# Patient Record
Sex: FEMALE | Race: WHITE | NOT HISPANIC OR LATINO | Employment: OTHER | URBAN - METROPOLITAN AREA
[De-identification: names, ages, dates, MRNs, and addresses within clinical notes are randomized per-mention and may not be internally consistent; named-entity substitution may affect disease eponyms.]

---

## 2017-01-23 ENCOUNTER — OFFICE VISIT - HEALTHEAST (OUTPATIENT)
Dept: PHYSICAL THERAPY | Facility: REHABILITATION | Age: 77
End: 2017-01-23

## 2017-01-23 DIAGNOSIS — M25.512 PAIN IN JOINT OF LEFT SHOULDER: ICD-10-CM

## 2017-01-23 DIAGNOSIS — I89.0 LYMPHEDEMA OF LEFT ARM: ICD-10-CM

## 2017-01-23 DIAGNOSIS — M25.612 STIFFNESS OF LEFT SHOULDER JOINT: ICD-10-CM

## 2017-01-23 DIAGNOSIS — M62.81 GENERALIZED MUSCLE WEAKNESS: ICD-10-CM

## 2017-01-25 ENCOUNTER — OFFICE VISIT - HEALTHEAST (OUTPATIENT)
Dept: PHYSICAL THERAPY | Facility: REHABILITATION | Age: 77
End: 2017-01-25

## 2017-01-25 DIAGNOSIS — M62.81 GENERALIZED MUSCLE WEAKNESS: ICD-10-CM

## 2017-01-25 DIAGNOSIS — M24.512 SHOULDER JOINT CONTRACTURE, LEFT: ICD-10-CM

## 2017-01-25 DIAGNOSIS — M25.612 STIFFNESS OF LEFT SHOULDER JOINT: ICD-10-CM

## 2017-01-25 DIAGNOSIS — I89.0 LYMPHEDEMA OF LEFT ARM: ICD-10-CM

## 2017-01-25 DIAGNOSIS — M25.512 PAIN IN JOINT OF LEFT SHOULDER: ICD-10-CM

## 2017-01-30 ENCOUNTER — OFFICE VISIT - HEALTHEAST (OUTPATIENT)
Dept: PHYSICAL THERAPY | Facility: REHABILITATION | Age: 77
End: 2017-01-30

## 2017-01-30 DIAGNOSIS — M25.512 PAIN IN JOINT OF LEFT SHOULDER: ICD-10-CM

## 2017-01-30 DIAGNOSIS — M62.81 GENERALIZED MUSCLE WEAKNESS: ICD-10-CM

## 2017-01-30 DIAGNOSIS — M25.612 STIFFNESS OF LEFT SHOULDER JOINT: ICD-10-CM

## 2017-01-30 DIAGNOSIS — I89.0 LYMPHEDEMA OF LEFT ARM: ICD-10-CM

## 2017-02-08 ENCOUNTER — OFFICE VISIT - HEALTHEAST (OUTPATIENT)
Dept: PHYSICAL THERAPY | Facility: REHABILITATION | Age: 77
End: 2017-02-08

## 2017-02-08 DIAGNOSIS — I89.0 LYMPHEDEMA OF LEFT ARM: ICD-10-CM

## 2017-03-06 ENCOUNTER — OFFICE VISIT - HEALTHEAST (OUTPATIENT)
Dept: PHYSICAL THERAPY | Facility: REHABILITATION | Age: 77
End: 2017-03-06

## 2017-03-06 ENCOUNTER — AMBULATORY - HEALTHEAST (OUTPATIENT)
Dept: ADMINISTRATIVE | Facility: REHABILITATION | Age: 77
End: 2017-03-06

## 2017-03-06 DIAGNOSIS — M62.81 GENERALIZED MUSCLE WEAKNESS: ICD-10-CM

## 2017-03-06 DIAGNOSIS — M25.611 STIFFNESS OF RIGHT SHOULDER JOINT: ICD-10-CM

## 2017-03-06 DIAGNOSIS — Z96.619 S/P REVERSE TOTAL SHOULDER ARTHROPLASTY: ICD-10-CM

## 2017-03-06 DIAGNOSIS — M25.512 PAIN IN JOINT OF LEFT SHOULDER: ICD-10-CM

## 2017-03-08 ENCOUNTER — OFFICE VISIT - HEALTHEAST (OUTPATIENT)
Dept: PHYSICAL THERAPY | Facility: REHABILITATION | Age: 77
End: 2017-03-08

## 2017-03-08 DIAGNOSIS — M24.512 SHOULDER JOINT CONTRACTURE, LEFT: ICD-10-CM

## 2017-03-08 DIAGNOSIS — M25.512 PAIN IN JOINT OF LEFT SHOULDER: ICD-10-CM

## 2017-03-08 DIAGNOSIS — M62.81 GENERALIZED MUSCLE WEAKNESS: ICD-10-CM

## 2017-03-15 ENCOUNTER — OFFICE VISIT - HEALTHEAST (OUTPATIENT)
Dept: PHYSICAL THERAPY | Facility: REHABILITATION | Age: 77
End: 2017-03-15

## 2017-03-15 ENCOUNTER — OFFICE VISIT - HEALTHEAST (OUTPATIENT)
Dept: VASCULAR SURGERY | Facility: CLINIC | Age: 77
End: 2017-03-15

## 2017-03-15 DIAGNOSIS — M25.512 LEFT SHOULDER PAIN: ICD-10-CM

## 2017-03-15 DIAGNOSIS — M25.512 PAIN IN JOINT OF LEFT SHOULDER: ICD-10-CM

## 2017-03-15 DIAGNOSIS — C50.912 MALIGNANT NEOPLASM OF LEFT FEMALE BREAST (H): ICD-10-CM

## 2017-03-15 DIAGNOSIS — I97.2 POST-MASTECTOMY LYMPHEDEMA SYNDROME: ICD-10-CM

## 2017-03-15 DIAGNOSIS — M24.512 SHOULDER JOINT CONTRACTURE, LEFT: ICD-10-CM

## 2017-03-15 DIAGNOSIS — M25.611 STIFFNESS OF RIGHT SHOULDER JOINT: ICD-10-CM

## 2017-03-15 DIAGNOSIS — L90.5 SCAR CONDITION AND FIBROSIS OF SKIN: ICD-10-CM

## 2017-03-15 DIAGNOSIS — M62.81 GENERALIZED MUSCLE WEAKNESS: ICD-10-CM

## 2017-03-20 ENCOUNTER — OFFICE VISIT - HEALTHEAST (OUTPATIENT)
Dept: PHYSICAL THERAPY | Facility: REHABILITATION | Age: 77
End: 2017-03-20

## 2017-03-20 DIAGNOSIS — M62.81 GENERALIZED MUSCLE WEAKNESS: ICD-10-CM

## 2017-03-20 DIAGNOSIS — M25.512 PAIN IN JOINT OF LEFT SHOULDER: ICD-10-CM

## 2017-03-20 DIAGNOSIS — M24.512 SHOULDER JOINT CONTRACTURE, LEFT: ICD-10-CM

## 2017-03-22 ENCOUNTER — OFFICE VISIT - HEALTHEAST (OUTPATIENT)
Dept: PHYSICAL THERAPY | Facility: REHABILITATION | Age: 77
End: 2017-03-22

## 2017-03-22 DIAGNOSIS — M62.81 GENERALIZED MUSCLE WEAKNESS: ICD-10-CM

## 2017-03-22 DIAGNOSIS — M25.512 PAIN IN JOINT OF LEFT SHOULDER: ICD-10-CM

## 2017-03-22 DIAGNOSIS — M24.512 SHOULDER JOINT CONTRACTURE, LEFT: ICD-10-CM

## 2017-03-27 ENCOUNTER — OFFICE VISIT - HEALTHEAST (OUTPATIENT)
Dept: PHYSICAL THERAPY | Facility: REHABILITATION | Age: 77
End: 2017-03-27

## 2017-03-27 DIAGNOSIS — M25.611 STIFFNESS OF RIGHT SHOULDER JOINT: ICD-10-CM

## 2017-03-27 DIAGNOSIS — M62.81 GENERALIZED MUSCLE WEAKNESS: ICD-10-CM

## 2017-03-27 DIAGNOSIS — M25.512 PAIN IN JOINT OF LEFT SHOULDER: ICD-10-CM

## 2017-03-27 DIAGNOSIS — M24.512 SHOULDER JOINT CONTRACTURE, LEFT: ICD-10-CM

## 2017-03-27 DIAGNOSIS — I89.0 LYMPHEDEMA OF LEFT ARM: ICD-10-CM

## 2017-03-29 ENCOUNTER — OFFICE VISIT - HEALTHEAST (OUTPATIENT)
Dept: PHYSICAL THERAPY | Facility: REHABILITATION | Age: 77
End: 2017-03-29

## 2017-03-29 DIAGNOSIS — I89.0 LYMPHEDEMA OF LEFT ARM: ICD-10-CM

## 2017-03-29 DIAGNOSIS — M25.611 STIFFNESS OF RIGHT SHOULDER JOINT: ICD-10-CM

## 2017-03-29 DIAGNOSIS — M25.612 STIFFNESS OF LEFT SHOULDER JOINT: ICD-10-CM

## 2017-03-29 DIAGNOSIS — M24.512 SHOULDER JOINT CONTRACTURE, LEFT: ICD-10-CM

## 2017-03-29 DIAGNOSIS — M25.512 PAIN IN JOINT OF LEFT SHOULDER: ICD-10-CM

## 2017-03-29 DIAGNOSIS — M62.81 GENERALIZED MUSCLE WEAKNESS: ICD-10-CM

## 2017-04-03 ENCOUNTER — OFFICE VISIT - HEALTHEAST (OUTPATIENT)
Dept: PHYSICAL THERAPY | Facility: REHABILITATION | Age: 77
End: 2017-04-03

## 2017-04-03 DIAGNOSIS — M25.612 STIFFNESS OF LEFT SHOULDER JOINT: ICD-10-CM

## 2017-04-03 DIAGNOSIS — M24.512 SHOULDER JOINT CONTRACTURE, LEFT: ICD-10-CM

## 2017-04-03 DIAGNOSIS — M25.611 STIFFNESS OF RIGHT SHOULDER JOINT: ICD-10-CM

## 2017-04-03 DIAGNOSIS — I89.0 LYMPHEDEMA OF LEFT ARM: ICD-10-CM

## 2017-04-03 DIAGNOSIS — M25.512 PAIN IN JOINT OF LEFT SHOULDER: ICD-10-CM

## 2017-04-03 DIAGNOSIS — M62.81 GENERALIZED MUSCLE WEAKNESS: ICD-10-CM

## 2017-04-05 ENCOUNTER — OFFICE VISIT - HEALTHEAST (OUTPATIENT)
Dept: PHYSICAL THERAPY | Facility: REHABILITATION | Age: 77
End: 2017-04-05

## 2017-04-05 DIAGNOSIS — M25.512 PAIN IN JOINT OF LEFT SHOULDER: ICD-10-CM

## 2017-04-05 DIAGNOSIS — M24.512 SHOULDER JOINT CONTRACTURE, LEFT: ICD-10-CM

## 2017-04-05 DIAGNOSIS — M62.81 GENERALIZED MUSCLE WEAKNESS: ICD-10-CM

## 2017-04-10 ENCOUNTER — OFFICE VISIT - HEALTHEAST (OUTPATIENT)
Dept: PHYSICAL THERAPY | Facility: REHABILITATION | Age: 77
End: 2017-04-10

## 2017-04-10 DIAGNOSIS — M62.81 GENERALIZED MUSCLE WEAKNESS: ICD-10-CM

## 2017-04-10 DIAGNOSIS — M25.512 PAIN IN JOINT OF LEFT SHOULDER: ICD-10-CM

## 2017-04-10 DIAGNOSIS — M24.512 SHOULDER JOINT CONTRACTURE, LEFT: ICD-10-CM

## 2017-04-12 ENCOUNTER — OFFICE VISIT - HEALTHEAST (OUTPATIENT)
Dept: PHYSICAL THERAPY | Facility: REHABILITATION | Age: 77
End: 2017-04-12

## 2017-04-12 DIAGNOSIS — M25.512 PAIN IN JOINT OF LEFT SHOULDER: ICD-10-CM

## 2017-04-12 DIAGNOSIS — M24.512 SHOULDER JOINT CONTRACTURE, LEFT: ICD-10-CM

## 2017-04-12 DIAGNOSIS — M62.81 GENERALIZED MUSCLE WEAKNESS: ICD-10-CM

## 2017-04-19 ENCOUNTER — OFFICE VISIT - HEALTHEAST (OUTPATIENT)
Dept: PHYSICAL THERAPY | Facility: REHABILITATION | Age: 77
End: 2017-04-19

## 2017-04-19 DIAGNOSIS — M25.512 PAIN IN JOINT OF LEFT SHOULDER: ICD-10-CM

## 2017-04-19 DIAGNOSIS — M24.512 SHOULDER JOINT CONTRACTURE, LEFT: ICD-10-CM

## 2017-04-19 DIAGNOSIS — M62.81 GENERALIZED MUSCLE WEAKNESS: ICD-10-CM

## 2017-04-26 ENCOUNTER — OFFICE VISIT - HEALTHEAST (OUTPATIENT)
Dept: PHYSICAL THERAPY | Facility: REHABILITATION | Age: 77
End: 2017-04-26

## 2017-04-26 DIAGNOSIS — M25.512 PAIN IN JOINT OF LEFT SHOULDER: ICD-10-CM

## 2017-04-26 DIAGNOSIS — M62.81 GENERALIZED MUSCLE WEAKNESS: ICD-10-CM

## 2017-04-26 DIAGNOSIS — M24.512 SHOULDER JOINT CONTRACTURE, LEFT: ICD-10-CM

## 2017-05-01 ENCOUNTER — OFFICE VISIT - HEALTHEAST (OUTPATIENT)
Dept: PHYSICAL THERAPY | Facility: REHABILITATION | Age: 77
End: 2017-05-01

## 2017-05-01 DIAGNOSIS — M62.81 GENERALIZED MUSCLE WEAKNESS: ICD-10-CM

## 2017-05-01 DIAGNOSIS — M25.612 STIFFNESS OF LEFT SHOULDER JOINT: ICD-10-CM

## 2017-05-01 DIAGNOSIS — M24.512 SHOULDER JOINT CONTRACTURE, LEFT: ICD-10-CM

## 2017-05-01 DIAGNOSIS — M25.512 PAIN IN JOINT OF LEFT SHOULDER: ICD-10-CM

## 2017-05-01 DIAGNOSIS — M25.611 STIFFNESS OF RIGHT SHOULDER JOINT: ICD-10-CM

## 2017-05-01 DIAGNOSIS — I89.0 LYMPHEDEMA OF LEFT ARM: ICD-10-CM

## 2017-05-08 ENCOUNTER — OFFICE VISIT - HEALTHEAST (OUTPATIENT)
Dept: PHYSICAL THERAPY | Facility: REHABILITATION | Age: 77
End: 2017-05-08

## 2017-05-08 DIAGNOSIS — M24.512 SHOULDER JOINT CONTRACTURE, LEFT: ICD-10-CM

## 2017-05-08 DIAGNOSIS — M62.81 GENERALIZED MUSCLE WEAKNESS: ICD-10-CM

## 2017-05-08 DIAGNOSIS — M25.512 PAIN IN JOINT OF LEFT SHOULDER: ICD-10-CM

## 2017-05-24 ENCOUNTER — OFFICE VISIT - HEALTHEAST (OUTPATIENT)
Dept: PHYSICAL THERAPY | Facility: REHABILITATION | Age: 77
End: 2017-05-24

## 2017-05-24 DIAGNOSIS — M24.512 SHOULDER JOINT CONTRACTURE, LEFT: ICD-10-CM

## 2017-05-24 DIAGNOSIS — M62.81 GENERALIZED MUSCLE WEAKNESS: ICD-10-CM

## 2017-05-24 DIAGNOSIS — M25.512 PAIN IN JOINT OF LEFT SHOULDER: ICD-10-CM

## 2017-06-12 ENCOUNTER — OFFICE VISIT - HEALTHEAST (OUTPATIENT)
Dept: VASCULAR SURGERY | Facility: CLINIC | Age: 77
End: 2017-06-12

## 2017-06-12 DIAGNOSIS — M24.512 SHOULDER JOINT CONTRACTURE, LEFT: ICD-10-CM

## 2017-06-12 DIAGNOSIS — C50.912 MALIGNANT NEOPLASM OF LEFT FEMALE BREAST (H): ICD-10-CM

## 2017-06-12 DIAGNOSIS — L90.5 SCAR CONDITION AND FIBROSIS OF SKIN: ICD-10-CM

## 2017-06-12 DIAGNOSIS — I97.2 POST-MASTECTOMY LYMPHEDEMA SYNDROME: ICD-10-CM

## 2017-11-06 ENCOUNTER — RECORDS - HEALTHEAST (OUTPATIENT)
Dept: ADMINISTRATIVE | Facility: OTHER | Age: 77
End: 2017-11-06

## 2017-11-27 ENCOUNTER — HOSPITAL ENCOUNTER (OUTPATIENT)
Dept: RADIOLOGY | Facility: HOSPITAL | Age: 77
Discharge: HOME | End: 2017-11-27
Attending: INTERNAL MEDICINE

## 2017-11-27 ENCOUNTER — COMMUNICATION - HEALTHEAST (OUTPATIENT)
Dept: TELEHEALTH | Facility: CLINIC | Age: 77
End: 2017-11-27

## 2017-11-27 ENCOUNTER — HOSPITAL ENCOUNTER (OUTPATIENT)
Dept: MAMMOGRAPHY | Facility: HOSPITAL | Age: 77
Discharge: HOME | End: 2017-11-27

## 2017-11-27 DIAGNOSIS — R07.9 CHEST PAIN: ICD-10-CM

## 2017-11-27 DIAGNOSIS — Z12.31 VISIT FOR SCREENING MAMMOGRAM: ICD-10-CM

## 2017-11-27 DIAGNOSIS — C50.919 BREAST CANCER (H): ICD-10-CM

## 2018-01-22 ENCOUNTER — AMBULATORY - HEALTHEAST (OUTPATIENT)
Dept: VASCULAR SURGERY | Facility: CLINIC | Age: 78
End: 2018-01-22

## 2018-01-22 ENCOUNTER — COMMUNICATION - HEALTHEAST (OUTPATIENT)
Dept: VASCULAR SURGERY | Facility: CLINIC | Age: 78
End: 2018-01-22

## 2018-01-22 DIAGNOSIS — I97.2 POST-MASTECTOMY LYMPHEDEMA SYNDROME: ICD-10-CM

## 2018-01-22 DIAGNOSIS — C50.912 MALIGNANT NEOPLASM OF LEFT FEMALE BREAST (H): ICD-10-CM

## 2018-06-11 ENCOUNTER — AMBULATORY - HEALTHEAST (OUTPATIENT)
Dept: VASCULAR SURGERY | Facility: CLINIC | Age: 78
End: 2018-06-11

## 2018-06-11 ENCOUNTER — COMMUNICATION - HEALTHEAST (OUTPATIENT)
Dept: VASCULAR SURGERY | Facility: CLINIC | Age: 78
End: 2018-06-11

## 2018-06-11 ENCOUNTER — OFFICE VISIT - HEALTHEAST (OUTPATIENT)
Dept: VASCULAR SURGERY | Facility: CLINIC | Age: 78
End: 2018-06-11

## 2018-06-11 DIAGNOSIS — I97.2 POST-MASTECTOMY LYMPHEDEMA SYNDROME: ICD-10-CM

## 2018-06-11 DIAGNOSIS — C50.912 MALIGNANT NEOPLASM OF LEFT FEMALE BREAST (H): ICD-10-CM

## 2018-06-11 DIAGNOSIS — L90.5 SCAR CONDITION AND FIBROSIS OF SKIN: ICD-10-CM

## 2018-06-11 DIAGNOSIS — M24.512 SHOULDER JOINT CONTRACTURE, LEFT: ICD-10-CM

## 2018-06-11 RX ORDER — ACETAMINOPHEN 500 MG
1000 TABLET ORAL
Status: SHIPPED | COMMUNITY
Start: 2017-01-17

## 2018-06-11 RX ORDER — MULTIPLE VITAMINS W/ MINERALS TAB 9MG-400MCG
1 TAB ORAL
Status: SHIPPED | COMMUNITY
Start: 2015-06-25

## 2018-06-11 RX ORDER — FLECAINIDE ACETATE 50 MG/1
50 TABLET ORAL
Status: SHIPPED | COMMUNITY
Start: 2015-06-25

## 2018-06-11 RX ORDER — POLYETHYLENE GLYCOL 3350 17 G/17G
17 POWDER, FOR SOLUTION ORAL
Status: SHIPPED | COMMUNITY
Start: 2018-06-11

## 2018-06-11 RX ORDER — ATORVASTATIN CALCIUM 10 MG/1
10 TABLET, FILM COATED ORAL DAILY
Refills: 3 | Status: SHIPPED | COMMUNITY
Start: 2018-04-29

## 2018-06-11 RX ORDER — DOCUSATE SODIUM 100 MG/1
100 CAPSULE, LIQUID FILLED ORAL
Status: SHIPPED | COMMUNITY
Start: 2018-06-11

## 2018-06-11 ASSESSMENT — MIFFLIN-ST. JEOR: SCORE: 923.14

## 2018-06-12 ENCOUNTER — COMMUNICATION - HEALTHEAST (OUTPATIENT)
Dept: VASCULAR SURGERY | Facility: CLINIC | Age: 78
End: 2018-06-12

## 2018-06-14 ENCOUNTER — RECORDS - HEALTHEAST (OUTPATIENT)
Dept: ADMINISTRATIVE | Facility: OTHER | Age: 78
End: 2018-06-14

## 2018-06-15 ENCOUNTER — RECORDS - HEALTHEAST (OUTPATIENT)
Dept: ADMINISTRATIVE | Facility: OTHER | Age: 78
End: 2018-06-15

## 2018-06-15 ENCOUNTER — COMMUNICATION - HEALTHEAST (OUTPATIENT)
Dept: VASCULAR SURGERY | Facility: CLINIC | Age: 78
End: 2018-06-15

## 2018-06-18 ENCOUNTER — AMBULATORY - HEALTHEAST (OUTPATIENT)
Dept: VASCULAR SURGERY | Facility: CLINIC | Age: 78
End: 2018-06-18

## 2018-07-09 ENCOUNTER — RECORDS - HEALTHEAST (OUTPATIENT)
Dept: ADMINISTRATIVE | Facility: OTHER | Age: 78
End: 2018-07-09

## 2018-07-12 ENCOUNTER — COMMUNICATION - HEALTHEAST (OUTPATIENT)
Dept: VASCULAR SURGERY | Facility: CLINIC | Age: 78
End: 2018-07-12

## 2018-09-27 ENCOUNTER — OFFICE VISIT - HEALTHEAST (OUTPATIENT)
Dept: VASCULAR SURGERY | Facility: CLINIC | Age: 78
End: 2018-09-27

## 2018-09-27 DIAGNOSIS — C50.912 MALIGNANT NEOPLASM OF LEFT FEMALE BREAST (H): ICD-10-CM

## 2018-09-27 DIAGNOSIS — M24.512 SHOULDER JOINT CONTRACTURE, LEFT: ICD-10-CM

## 2018-09-27 DIAGNOSIS — L90.5 SCAR CONDITION AND FIBROSIS OF SKIN: ICD-10-CM

## 2018-09-27 DIAGNOSIS — Z87.2 HISTORY OF CELLULITIS: ICD-10-CM

## 2018-09-27 DIAGNOSIS — I97.2 POST-MASTECTOMY LYMPHEDEMA SYNDROME: ICD-10-CM

## 2018-09-27 ASSESSMENT — MIFFLIN-ST. JEOR: SCORE: 920.41

## 2019-02-05 ENCOUNTER — HOSPITAL ENCOUNTER (OUTPATIENT)
Dept: MAMMOGRAPHY | Facility: CLINIC | Age: 79
Discharge: HOME | End: 2019-02-05
Attending: OBSTETRICS & GYNECOLOGY

## 2019-02-05 DIAGNOSIS — Z12.31 VISIT FOR SCREENING MAMMOGRAM: ICD-10-CM

## 2019-09-18 ENCOUNTER — COMMUNICATION - HEALTHEAST (OUTPATIENT)
Dept: VASCULAR SURGERY | Facility: CLINIC | Age: 79
End: 2019-09-18

## 2019-09-18 ENCOUNTER — AMBULATORY - HEALTHEAST (OUTPATIENT)
Dept: VASCULAR SURGERY | Facility: CLINIC | Age: 79
End: 2019-09-18

## 2019-09-18 DIAGNOSIS — I97.2 POST-MASTECTOMY LYMPHEDEMA SYNDROME: ICD-10-CM

## 2019-09-18 DIAGNOSIS — C50.912 MALIGNANT NEOPLASM OF LEFT FEMALE BREAST (H): ICD-10-CM

## 2019-09-26 ENCOUNTER — OFFICE VISIT - HEALTHEAST (OUTPATIENT)
Dept: VASCULAR SURGERY | Facility: CLINIC | Age: 79
End: 2019-09-26

## 2019-09-26 DIAGNOSIS — I97.2 POST-MASTECTOMY LYMPHEDEMA SYNDROME: ICD-10-CM

## 2019-09-26 DIAGNOSIS — C50.012 MALIGNANT NEOPLASM OF NIPPLE OF LEFT BREAST IN FEMALE, UNSPECIFIED ESTROGEN RECEPTOR STATUS (H): ICD-10-CM

## 2019-09-26 DIAGNOSIS — M24.512 SHOULDER JOINT CONTRACTURE, LEFT: ICD-10-CM

## 2019-09-26 DIAGNOSIS — L90.5 SCAR CONDITION AND FIBROSIS OF SKIN: ICD-10-CM

## 2019-09-26 DIAGNOSIS — Z87.2 HISTORY OF CELLULITIS: ICD-10-CM

## 2019-09-26 RX ORDER — AMOXICILLIN 250 MG
1 CAPSULE ORAL 2 TIMES DAILY
Status: SHIPPED | COMMUNITY
Start: 2018-11-04

## 2019-09-26 RX ORDER — METOPROLOL TARTRATE 25 MG/1
25 TABLET, FILM COATED ORAL 2 TIMES DAILY
Status: SHIPPED | COMMUNITY
Start: 2015-06-25

## 2019-09-26 RX ORDER — DIAPER,BRIEF,INFANT-TODD,DISP
1 EACH MISCELLANEOUS 2 TIMES DAILY
Status: SHIPPED | COMMUNITY
Start: 2015-06-25

## 2019-09-26 ASSESSMENT — MIFFLIN-ST. JEOR: SCORE: 879.58

## 2019-10-10 ENCOUNTER — OFFICE VISIT - HEALTHEAST (OUTPATIENT)
Dept: PHYSICAL THERAPY | Facility: REHABILITATION | Age: 79
End: 2019-10-10

## 2019-10-10 DIAGNOSIS — L90.5 SCAR CONDITION AND FIBROSIS OF SKIN: ICD-10-CM

## 2019-10-10 DIAGNOSIS — M62.81 MUSCLE WEAKNESS (GENERALIZED): ICD-10-CM

## 2019-10-10 DIAGNOSIS — M24.512 SHOULDER JOINT CONTRACTURE, LEFT: ICD-10-CM

## 2019-10-10 DIAGNOSIS — I89.0 LYMPHEDEMA OF LEFT ARM: ICD-10-CM

## 2019-10-15 ENCOUNTER — OFFICE VISIT - HEALTHEAST (OUTPATIENT)
Dept: PHYSICAL THERAPY | Facility: REHABILITATION | Age: 79
End: 2019-10-15

## 2019-10-15 DIAGNOSIS — M24.512 SHOULDER JOINT CONTRACTURE, LEFT: ICD-10-CM

## 2019-10-15 DIAGNOSIS — L90.5 SCAR CONDITION AND FIBROSIS OF SKIN: ICD-10-CM

## 2019-10-15 DIAGNOSIS — I89.0 LYMPHEDEMA OF LEFT ARM: ICD-10-CM

## 2019-10-15 DIAGNOSIS — M62.81 MUSCLE WEAKNESS (GENERALIZED): ICD-10-CM

## 2019-10-22 ENCOUNTER — OFFICE VISIT - HEALTHEAST (OUTPATIENT)
Dept: PHYSICAL THERAPY | Facility: REHABILITATION | Age: 79
End: 2019-10-22

## 2019-10-22 DIAGNOSIS — M62.81 MUSCLE WEAKNESS (GENERALIZED): ICD-10-CM

## 2019-10-22 DIAGNOSIS — I89.0 LYMPHEDEMA OF LEFT ARM: ICD-10-CM

## 2019-10-22 DIAGNOSIS — M24.512 SHOULDER JOINT CONTRACTURE, LEFT: ICD-10-CM

## 2019-10-22 DIAGNOSIS — L90.5 SCAR CONDITION AND FIBROSIS OF SKIN: ICD-10-CM

## 2019-10-30 ENCOUNTER — OFFICE VISIT - HEALTHEAST (OUTPATIENT)
Dept: PHYSICAL THERAPY | Facility: REHABILITATION | Age: 79
End: 2019-10-30

## 2019-10-30 DIAGNOSIS — L90.5 SCAR CONDITION AND FIBROSIS OF SKIN: ICD-10-CM

## 2019-10-30 DIAGNOSIS — M24.512 SHOULDER JOINT CONTRACTURE, LEFT: ICD-10-CM

## 2019-10-30 DIAGNOSIS — I89.0 LYMPHEDEMA OF LEFT ARM: ICD-10-CM

## 2019-10-30 DIAGNOSIS — M62.81 MUSCLE WEAKNESS (GENERALIZED): ICD-10-CM

## 2019-11-12 ENCOUNTER — OFFICE VISIT - HEALTHEAST (OUTPATIENT)
Dept: PHYSICAL THERAPY | Facility: REHABILITATION | Age: 79
End: 2019-11-12

## 2019-11-12 DIAGNOSIS — I89.0 LYMPHEDEMA OF LEFT ARM: ICD-10-CM

## 2019-11-12 DIAGNOSIS — M24.512 SHOULDER JOINT CONTRACTURE, LEFT: ICD-10-CM

## 2019-11-12 DIAGNOSIS — M62.81 MUSCLE WEAKNESS (GENERALIZED): ICD-10-CM

## 2019-11-12 DIAGNOSIS — L90.5 SCAR CONDITION AND FIBROSIS OF SKIN: ICD-10-CM

## 2020-01-06 ENCOUNTER — OFFICE VISIT - HEALTHEAST (OUTPATIENT)
Dept: VASCULAR SURGERY | Facility: CLINIC | Age: 80
End: 2020-01-06

## 2020-01-06 DIAGNOSIS — M24.512 SHOULDER JOINT CONTRACTURE, LEFT: ICD-10-CM

## 2020-01-06 DIAGNOSIS — C50.912 MALIGNANT NEOPLASM OF LEFT BREAST IN FEMALE, ESTROGEN RECEPTOR POSITIVE, UNSPECIFIED SITE OF BREAST (H): ICD-10-CM

## 2020-01-06 DIAGNOSIS — L90.5 SCAR CONDITION AND FIBROSIS OF SKIN: ICD-10-CM

## 2020-01-06 DIAGNOSIS — Z17.0 MALIGNANT NEOPLASM OF LEFT BREAST IN FEMALE, ESTROGEN RECEPTOR POSITIVE, UNSPECIFIED SITE OF BREAST (H): ICD-10-CM

## 2020-01-06 DIAGNOSIS — Z87.2 HISTORY OF CELLULITIS: ICD-10-CM

## 2020-01-06 DIAGNOSIS — I97.2 POST-MASTECTOMY LYMPHEDEMA SYNDROME: ICD-10-CM

## 2020-01-06 ASSESSMENT — MIFFLIN-ST. JEOR: SCORE: 888.66

## 2020-01-07 ENCOUNTER — COMMUNICATION - HEALTHEAST (OUTPATIENT)
Dept: OTHER | Facility: CLINIC | Age: 80
End: 2020-01-07

## 2020-01-14 ENCOUNTER — COMMUNICATION - HEALTHEAST (OUTPATIENT)
Dept: OTHER | Facility: CLINIC | Age: 80
End: 2020-01-14

## 2020-01-16 ENCOUNTER — COMMUNICATION - HEALTHEAST (OUTPATIENT)
Dept: OTHER | Facility: CLINIC | Age: 80
End: 2020-01-16

## 2020-01-23 ENCOUNTER — AMBULATORY - HEALTHEAST (OUTPATIENT)
Dept: OTHER | Facility: CLINIC | Age: 80
End: 2020-01-23

## 2020-01-27 ENCOUNTER — COMMUNICATION - HEALTHEAST (OUTPATIENT)
Dept: OTHER | Facility: CLINIC | Age: 80
End: 2020-01-27

## 2020-01-28 ENCOUNTER — AMBULATORY - HEALTHEAST (OUTPATIENT)
Dept: OTHER | Facility: CLINIC | Age: 80
End: 2020-01-28

## 2020-02-17 ENCOUNTER — COMMUNICATION - HEALTHEAST (OUTPATIENT)
Dept: VASCULAR SURGERY | Facility: CLINIC | Age: 80
End: 2020-02-17

## 2020-02-19 ENCOUNTER — HOSPITAL ENCOUNTER (OUTPATIENT)
Dept: MAMMOGRAPHY | Facility: CLINIC | Age: 80
Discharge: HOME | End: 2020-02-19

## 2020-02-19 DIAGNOSIS — Z12.31 SCREENING MAMMOGRAM, ENCOUNTER FOR: ICD-10-CM

## 2020-07-06 ENCOUNTER — OFFICE VISIT - HEALTHEAST (OUTPATIENT)
Dept: VASCULAR SURGERY | Facility: CLINIC | Age: 80
End: 2020-07-06

## 2020-07-06 DIAGNOSIS — L90.5 SCAR CONDITION AND FIBROSIS OF SKIN: ICD-10-CM

## 2020-07-06 DIAGNOSIS — C50.912 MALIGNANT NEOPLASM OF LEFT BREAST IN FEMALE, ESTROGEN RECEPTOR POSITIVE, UNSPECIFIED SITE OF BREAST (H): ICD-10-CM

## 2020-07-06 DIAGNOSIS — I97.2 POST-MASTECTOMY LYMPHEDEMA SYNDROME: ICD-10-CM

## 2020-07-06 DIAGNOSIS — M24.512 SHOULDER JOINT CONTRACTURE, LEFT: ICD-10-CM

## 2020-07-06 DIAGNOSIS — Z87.2 HISTORY OF CELLULITIS: ICD-10-CM

## 2020-07-06 DIAGNOSIS — Z17.0 MALIGNANT NEOPLASM OF LEFT BREAST IN FEMALE, ESTROGEN RECEPTOR POSITIVE, UNSPECIFIED SITE OF BREAST (H): ICD-10-CM

## 2020-07-06 DIAGNOSIS — M46.46 DISCITIS OF LUMBAR REGION: ICD-10-CM

## 2020-07-08 ENCOUNTER — COMMUNICATION - HEALTHEAST (OUTPATIENT)
Dept: OTHER | Facility: CLINIC | Age: 80
End: 2020-07-08

## 2020-07-15 ENCOUNTER — COMMUNICATION - HEALTHEAST (OUTPATIENT)
Dept: OTHER | Facility: CLINIC | Age: 80
End: 2020-07-15

## 2020-07-21 ENCOUNTER — AMBULATORY - HEALTHEAST (OUTPATIENT)
Dept: OTHER | Facility: CLINIC | Age: 80
End: 2020-07-21

## 2020-07-22 ENCOUNTER — AMBULATORY - HEALTHEAST (OUTPATIENT)
Dept: VASCULAR SURGERY | Facility: CLINIC | Age: 80
End: 2020-07-22

## 2020-07-22 DIAGNOSIS — I97.2 POST-MASTECTOMY LYMPHEDEMA SYNDROME: ICD-10-CM

## 2020-08-18 ENCOUNTER — AMBULATORY - HEALTHEAST (OUTPATIENT)
Dept: OTHER | Facility: CLINIC | Age: 80
End: 2020-08-18

## 2021-01-07 ENCOUNTER — OFFICE VISIT - HEALTHEAST (OUTPATIENT)
Dept: VASCULAR SURGERY | Facility: CLINIC | Age: 81
End: 2021-01-07

## 2021-01-07 DIAGNOSIS — L90.5 SCAR CONDITION AND FIBROSIS OF SKIN: ICD-10-CM

## 2021-01-07 DIAGNOSIS — Z87.2 HISTORY OF CELLULITIS: ICD-10-CM

## 2021-01-07 DIAGNOSIS — Z17.0 MALIGNANT NEOPLASM OF LEFT BREAST IN FEMALE, ESTROGEN RECEPTOR POSITIVE, UNSPECIFIED SITE OF BREAST (H): ICD-10-CM

## 2021-01-07 DIAGNOSIS — I97.2 POST-MASTECTOMY LYMPHEDEMA SYNDROME: ICD-10-CM

## 2021-01-07 DIAGNOSIS — C50.912 MALIGNANT NEOPLASM OF LEFT BREAST IN FEMALE, ESTROGEN RECEPTOR POSITIVE, UNSPECIFIED SITE OF BREAST (H): ICD-10-CM

## 2021-01-07 DIAGNOSIS — M24.512 SHOULDER JOINT CONTRACTURE, LEFT: ICD-10-CM

## 2021-01-07 ASSESSMENT — MIFFLIN-ST. JEOR: SCORE: 885.48

## 2021-01-08 ENCOUNTER — COMMUNICATION - HEALTHEAST (OUTPATIENT)
Dept: OTHER | Facility: CLINIC | Age: 81
End: 2021-01-08

## 2021-03-03 ENCOUNTER — COMMUNICATION - HEALTHEAST (OUTPATIENT)
Dept: OTHER | Facility: CLINIC | Age: 81
End: 2021-03-03

## 2021-03-12 ENCOUNTER — AMBULATORY - HEALTHEAST (OUTPATIENT)
Dept: OTHER | Facility: CLINIC | Age: 81
End: 2021-03-12

## 2021-03-25 ENCOUNTER — AMBULATORY - HEALTHEAST (OUTPATIENT)
Dept: OTHER | Facility: CLINIC | Age: 81
End: 2021-03-25

## 2021-03-25 ENCOUNTER — AMBULATORY - HEALTHEAST (OUTPATIENT)
Dept: VASCULAR SURGERY | Facility: CLINIC | Age: 81
End: 2021-03-25

## 2021-03-25 DIAGNOSIS — I97.2 POST-MASTECTOMY LYMPHEDEMA SYNDROME: ICD-10-CM

## 2021-03-30 ENCOUNTER — AMBULATORY - HEALTHEAST (OUTPATIENT)
Dept: OTHER | Facility: CLINIC | Age: 81
End: 2021-03-30

## 2021-05-22 ENCOUNTER — HEALTH MAINTENANCE LETTER (OUTPATIENT)
Age: 81
End: 2021-05-22

## 2021-05-26 ENCOUNTER — RECORDS - HEALTHEAST (OUTPATIENT)
Dept: ADMINISTRATIVE | Facility: CLINIC | Age: 81
End: 2021-05-26

## 2021-05-27 ENCOUNTER — RECORDS - HEALTHEAST (OUTPATIENT)
Dept: ADMINISTRATIVE | Facility: CLINIC | Age: 81
End: 2021-05-27

## 2021-05-28 ENCOUNTER — RECORDS - HEALTHEAST (OUTPATIENT)
Dept: ADMINISTRATIVE | Facility: CLINIC | Age: 81
End: 2021-05-28

## 2021-05-29 ENCOUNTER — RECORDS - HEALTHEAST (OUTPATIENT)
Dept: ADMINISTRATIVE | Facility: CLINIC | Age: 81
End: 2021-05-29

## 2021-05-30 ENCOUNTER — RECORDS - HEALTHEAST (OUTPATIENT)
Dept: ADMINISTRATIVE | Facility: CLINIC | Age: 81
End: 2021-05-30

## 2021-05-31 ENCOUNTER — RECORDS - HEALTHEAST (OUTPATIENT)
Dept: ADMINISTRATIVE | Facility: CLINIC | Age: 81
End: 2021-05-31

## 2021-06-01 ENCOUNTER — RECORDS - HEALTHEAST (OUTPATIENT)
Dept: ADMINISTRATIVE | Facility: CLINIC | Age: 81
End: 2021-06-01

## 2021-06-01 VITALS — HEIGHT: 61 IN | WEIGHT: 111.5 LBS | BODY MASS INDEX: 21.05 KG/M2

## 2021-06-01 NOTE — PATIENT INSTRUCTIONS - HE
A referral was sent to [facility]. You will receive a phone call in 1-2 business days to schedule you appointment. If for some reason you do not hear from them or wish to schedule sooner please call [phone] to schedule.  [facility] - [address], [address]  [place] [address]    LYMPHEDEMA THERAPY TREATMENT     - What to expect your first visit     Based on your initial evaluation, you will have an individualized program designed by a certified lymphedema therapist.     It will consist of discussing your prior activity level, goals and limitations.     The therapist will assess your edema, evaluate for swelling, ,measure your motion and strength.      - Treatment usually includes     Lymphedema education and prevention strategies.     Lymphedema massage- A special decompressive massage to help improve the lymphatic drainage.     Lymphatic stimulation exercises     Bandaging or compression garments- To provide increased tissue pressure in the swollen extremity.     Home exercise program instruction     Stretching exercises     Education in how to independently manage edema.        - Follow up care     ONCE FORMAL THERAPY HAS BEEN COMPLETED, THE PATIENT WILL BE EXPECTED TO WEAR THE APPROPRIATE COMPRESSION BANDAGES, BE CONSISTENT WITH THE SKIN CARE PROGRAM  AND PERFORM THE PRESCRIBED SELF -MASSAGE AND /OR EXERCISES AS PRESCRIBED.

## 2021-06-01 NOTE — TELEPHONE ENCOUNTER
Called patient and updated her that Dr. Mcneal prescribed an antibiotic for her to start taking. She will be seen at her follow up appointment next week. If she develops a fever 100.5 or greater she should go to the ER.

## 2021-06-01 NOTE — PROGRESS NOTES
Date of Service: 09/26/19    Date last Seen:  09/27/18    PCP: Sheldon Chowdhury MD    Impression:   1. Left upper extremity post mastectomy lymphedema -worsened after recent bee sting  2. Left shoulder contracture and fibrosis with significant tightness in left upper trapezius after shoulder replacement  3. Osteoarthritis left shoulder.  4. Cellulitis left arm history (1/18, 6/13/18)  5. Left breast carcinoma  (2003 mod rad mastectomy, chemo and rad)    Plan:   1. Questions were answered.    2. Refill doxycycline to be used,  if needed.  She knows how to use it appropriately and is at high risk.  3.To therapy to control swelling better as she is at high risk for complications.    4. Continue present program with flexitouch, exercise and compression.     5.  Follow up with me in 3 months, or when needed.    Time spent with patient 25 minutes, with greater than 50% time in consultation, education and coordination of care.   _____________________________________________________________________     Chief Complaint: Left arm swelling     History of Present Illness: Sherita Borden returns to the [facility] for follow up of left arm swelling due to postmastectomy lymphedema complicated by left shoulder replacement.  She was diagnosed with breast cancer on the left in 2003 and had modified radical mastectomy followed by chemotherapy and radiation.  She then had a reverse left shoulder replacement on January 17, 2017.  Treatment for her postmastectomy swelling has included lymphedema bandaging, compression sleeve, night time compression with bandaging, exercises, massage and bandaging, range of motion and elevation.  She has had problems with recurrent infections occurring in January 2018 and July 2018.  They responded to antibiotics.  She wears her sleeve most of the days and wears night time compression.  One week ago she was bit by a bee and had to remove the stinger  from the left arm.  The arm became swollen so she began the doxycycline after calling the clinic.  The arm is better but still swollen.  There has been no new numbness, tingling or weakness. There have been no new masses, rashes, or swellings of any other joints. There has been no new unexplained weight loss, loss of appetite, nausea and/or vomiting, shortness of breath, weight loss and chest pain.  She needs new bras and also new compression sleeves.     Past Medical History:   Diagnosis Date     Asymptomatic Postmenopausal Status     Created by Conversion      Atrial fibrillation (H)      Breast cancer (H) 09/2003     Hx antineoplastic chemotherapy      Hx of radiation therapy      Hypercholesteremia      Osteopenia     Created by Conversion      Personal history of breast cancer 7/2/2015     Post-mastectomy lymphedema syndrome 7/2/2015       Past Surgical History:   Procedure Laterality Date     AORTA SURGERY       BREAST BIOPSY       CLAVICLE SURGERY       HERNIA REPAIR       left shoulder replacement       MASTECTOMY Left      TENDON REPAIR Left 6/15/15     TOTAL HIP ARTHROPLASTY Right     2x       Current Outpatient Medications   Medication Sig Dispense Refill     acetaminophen (TYLENOL) 500 MG tablet Take 1,000 mg by mouth.       atorvastatin (LIPITOR) 10 MG tablet Take 10 mg by mouth daily.  3     biotin-keratin 10,000-100 mcg-mg Tab Take by mouth.       CALCIUM CITRATE ORAL Take 1,000 mg by mouth 2 times a day at 6:00 am and 4:00 pm.       cholecalciferol, vitamin D3, 400 unit Tab Take 1,000 Units by mouth daily.       docusate sodium (COLACE) 100 MG capsule Take 100 mg by mouth.       doxycycline (MONODOX) 100 MG capsule Take 1 capsule (100 mg total) by mouth 2 (two) times a day for 10 days. 20 capsule 0     flecainide (TAMBOCOR) 50 MG tablet Take 50 mg by mouth.       metoprolol tartrate (LOPRESSOR) 25 MG tablet Take 25 mg by mouth 2 (two) times a day.       multivitamin-minerals-lutein (CENTRUM SILVER)  tablet Take 1 tablet by mouth.       polyethylene glycol (MIRALAX) 17 gram packet Take 17 g by mouth.       rivaroxaban (XARELTO) 20 mg Tab TAKE 1 TABLET BY MOUTH DAILY.       senna-docusate (PERICOLACE) 8.6-50 mg tablet Take 1 tablet by mouth 2 (two) times a day.       BIOTIN ORAL Take 1 tablet by mouth daily.       calcium-mag-vit B6-D3-minerals 230-72-7-125 fg-ds-da-unit Tab Take 1 tablet by mouth 2 (two) times a day.       doxycycline (MONODOX) 100 MG capsule Take 1 capsule (100 mg total) by mouth 2 (two) times a day for 10 days. 20 capsule 0     No current facility-administered medications for this visit.        Allergies   Allergen Reactions     Dicloxacillin Sodium Hives     Clindamycin Nausea And Vomiting     Penicillins Hives     Atorvastatin Unknown       Social History     Socioeconomic History     Marital status:      Spouse name: Not on file     Number of children: Not on file     Years of education: Not on file     Highest education level: Not on file   Occupational History     Not on file   Social Needs     Financial resource strain: Not on file     Food insecurity:     Worry: Not on file     Inability: Not on file     Transportation needs:     Medical: Not on file     Non-medical: Not on file   Tobacco Use     Smoking status: Never Smoker     Smokeless tobacco: Never Used   Substance and Sexual Activity     Alcohol use: No     Drug use: No     Sexual activity: Yes     Partners: Male     Birth control/protection: Post-menopausal   Lifestyle     Physical activity:     Days per week: Not on file     Minutes per session: Not on file     Stress: Not on file   Relationships     Social connections:     Talks on phone: Not on file     Gets together: Not on file     Attends Je[employer] witness service: Not on file     Active member of club or organization: Not on file     Attends meetings of clubs or organizations: Not on file     Relationship status: Not on file     Intimate partner violence:     Fear of  current or ex partner: Not on file     Emotionally abused: Not on file     Physically abused: Not on file     Forced sexual activity: Not on file   Other Topics Concern     Not on file   Social History Narrative    .  2 children.  Retired .       Family History   Problem Relation Age of Onset     Corneal abrasion Mother      Arthritis Mother      COPD Father      Arthritis Father      Breast cancer Neg Hx        Review of Systems:  Review of systems is otherwise negative, except as noted above.  Full 12 point review of systems was completed.    Imaging:    I personally reviewed the following imaging today and those on care everywhere, if indicated    RIGHT FULL FIELD DIGITAL SCREENING MAMMOGRAM WITH TOMOSYNTHESIS     Performed on: 2/5/19           Compared to: 11/27/2017 Mammo Screening Right, 11/18/2016 Mammo Screening Right, 11/17/2015 Mammo Screening Right, 11/06/2014 Mammo Screening Right, 10/07/2013 Mammo Screening Right, 10/01/2012 Mammo Screening Right, 09/28/2011 Mammo Screening Right, 09/22/2010 Mammo Screening Right, 09/17/2009 Mammo Screening Right, and 09/15/2008 Mammo Screening Right     Findings: The patient is status post left mastectomy. The right breast has scattered areas of fibroglandular densities. There is no radiographic evidence of malignancy.  This study was evaluated with the assistance of Computer-Aided Detection. Breast Tomosynthesis was used in interpretation. Repeat routine screening mammogram in one year is recommended.      ACR BI-RADS Category 1: Negative    Labs:    I personally reviewed the following labs today and those on care everywhere, if indicated    2/12/18  BUN 23  Cr 0.78      Physical Exam:  Vitals:    09/26/19 1002   BP: 118/70   Pulse: (!) 56   Resp: 16   Temp: 98.1  F (36.7  C)     BMI 21.16 (stable)  103 pounds     Circumferential measures:    Vasc Edema 3/15/2017 6/12/2017 6/11/2018 9/27/2018 9/26/2019   Right-just above MCP 16 17.6 18  18 18   Right Wrist 13 14 14 13.5 13.5   Right Up 10cm 17.4 16.2 18.5 18.5 18.5   Right Up 10cm From Elbow 22.4 23.2 24.1 25 25.2   Left-just above MCP 16 17.4 17.2 17.6 17.2   Left Wrist 12.5 14.5 14.5 14 13.3   Left Up 10cm 18.1 18.3 20.2 20.5 20.5   Left Up 10cm From Elbow 24.9 25.9 26.2 27 26.5     Swelling is stable.    General:  79 y.o. female in no apparent distress.      Psych:  Alert and oriented x 3.  Cooperative. Affect normal.    HEENT: Atraumatic and normocephalic.    Musculoskeletal:  Range of motion in shoulders, elbows and wrists is normal except for continued decreased motion in the left shoulder to 90 degrees abduction and 95 degrees forward flexion. There is no active joint synovitis, erythema, swelling or joint laxity.      Neurological:  Sensation is intact to pinprick and light touch throughout the upper extremities bilaterally.  Strength testing is normal in shoulder abduction, elbow flexion, elbow extension, wrist extension, hand intrinsics bilaterally. Biceps, triceps and brachioradialis reflexes normal bilaterally.     Vascular: Radial arterial pulses are strong and equal at the wrists bilaterally.  There is no unusual venous distention.     Integumentary: Skin of the arms is warm,dry and uniform without rubor, calor or pain to palpation. Stemmer's sign in the hand is negative.  Increased fibrosis with decreased venous pattern in left medial forearm in area of bee bite.  No pain to palpation.      Lymph node exam: There is no cervical, supraclavicular, infraclavicular, or axillary lymphadenopathy on either side noted.    Janett Mcneal MD, ABWMS, FACCWS, [facility]  Medical Director Wound Care and Lymphedema  [facility]  [phone]

## 2021-06-01 NOTE — TELEPHONE ENCOUNTER
Sherita called with a concern that she was bit by a bee a few days ago and her left arm is red, itchy, warm and swollen. The area is about 2 x 3 cm. She has a follow up next week 9/26 with Dr. Mcneal but is wondering if she can get in today.    Will forward message to Dr. Mcneal if she would like to see her today in open appointment slot or order antibiotic and follow up next week as scheduled. Let Sherita know we would get back to her after talking with Dr. Mcneal.

## 2021-06-02 ENCOUNTER — RECORDS - HEALTHEAST (OUTPATIENT)
Dept: ADMINISTRATIVE | Facility: CLINIC | Age: 81
End: 2021-06-02

## 2021-06-02 VITALS — HEIGHT: 61 IN | WEIGHT: 112 LBS | BODY MASS INDEX: 21.14 KG/M2

## 2021-06-02 NOTE — PROGRESS NOTES
[facility] Certification Request    October 10, 2019      Patient: Sherita Borden  MR Number: [id number]  YOB: 1940  Date of Visit: 10/10/2019      Dear Dr. Janett Mcneal:    Thank you for this referral.   We are seeing Sherita Borden for Physical Therapy of lymphedema of the left UE.    Medicare and/or Medicaid requires physician review and approval of the treatment plan. Please review the plan of care and verify that you agree with the therapy plan of care by co-signing this note.      Plan of Care  Authorization / Certification Start Date: 10/10/19  Authorization / Certification End Date: 01/08/20  Authorization / Certification Number of Visits: 8-10  Communication with: Referral Source  Patient Related Instruction: Nature of Condition;Treatment plan and rationale;Self Care instruction;Basis of treatment;Body mechanics;Posture;Precautions;Next steps;Expected outcome  Times per Week: 2-3  Number of Weeks: 4-6  Number of Visits: 8-10  Discharge Planning: when goals have been met   Therapeutic Exercise: ROM;Strengthening;Stretching;Lymphedema  Neuromuscular Reeducation: kinesio tape;posture;core  Manual Therapy: soft tissue mobilization;myofascial release;joint mobilization;muscle energy;lymphatic drainage massage  Modalities: cold pack;hot pack      Goals:  No data recorded  Patient will have a decreased volume in : UE;by 5%;to decrease risk of infection;for better fit of clothing;for improved body image;in 6 weeks  Patient will have decreased fibrosis, scar tissue for improved lymphatic mobilitiy : in 6 weeks  Patient will perform, verbalize self-management of: skin care;self-monitoring;exercise;massage;self-compression;infection prevention;in 6 weeks        If you have any questions or concerns, please don't hesitate to call.    Sincerely,      Florence Drew, PT        Physician recommendation:     _X__ Follow therapist's recommendation        ___ Modify therapy      *Physician  co-signature indicates they certify the need for these services furnished within this plan and while under their care.      [facility]   Lymphedema Initial Evaluation      Patient Name: Sherita Borden  Date of evaluation: 10/10/2019  Referral Diagnosis: Post-mastectomy lymphedema syndrome  Referring provider: Janett Mcneal, *  Visit Diagnosis:     ICD-10-CM    1. Lymphedema of left arm I89.0    2. 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 Scar condition and fibrosis of skin L90.5    4. Muscle weakness (generalized) M62.81        Assessment:     Sherita Borden is a 79 y.o. female who presents to therapy today with chief complaints of left arm edema following a recent bee string infection and a hx of lymphedema after left breast cancer with mastectomy, radiation and chemotherapy. Patient has garments for daytime and night time and has a flexitouch for at home. There is a 14.25% increase in her left arm most noteably in the left forearm and just above the elbow area. The patient will benefit from skilled therapy to decrease edema, decrease tightness in order to decrease risk for infections and improve quality of like and body image,     Impairments in  pain, posture, ROM, joint mobility, strength  The POC is dynamic and will be modified on an ongoing basis.  Barriers to achieving goals as noted in the assessment section may affect outcome.  Prognosis to achieve goals is  good   Pt. would be a good candidate for edema management and to develop a home management program.    Goals:   No data recorded  Patient will have a decreased volume in : UE;by 5%;to decrease risk of infection;for better fit of clothing;for improved body image;in 6 weeks  Patient will have decreased fibrosis, scar tissue for improved lymphatic mobilitiy : in 6 weeks  Patient will perform, verbalize self-management of: skin care;self-monitoring;exercise;massage;self-compression;infection prevention;in 6 weeks      Patient's  expectations/goals are realistic.    Barriers to Learning or Achieving Goals:  Chronicity of the problem.  Co-morbidities or other medical factors.  .    Lymphedema Category:  IV - Stage 1 with Mod-High Functional Demand       Plan / Patient Instructions:      Plan of Care:   Authorization / Certification Start Date: 10/10/19  Authorization / Certification End Date: 01/08/20  Authorization / Certification Number of Visits: 8-10  Communication with: Referral Source  Patient Related Instruction: Nature of Condition;Treatment plan and rationale;Self Care instruction;Basis of treatment;Body mechanics;Posture;Precautions;Next steps;Expected outcome  Times per Week: 2-3  Number of Weeks: 4-6  Number of Visits: 8-10  Discharge Planning: when goals have been met   Therapeutic Exercise: ROM;Strengthening;Stretching;Lymphedema  Neuromuscular Reeducation: kinesio tape;posture;core  Manual Therapy: soft tissue mobilization;myofascial release;joint mobilization;muscle energy;lymphatic drainage massage  Modalities: cold pack;hot pack      Plan for next visit: assess bandages, reviewed HEP, begin MLD and work on fibrosis      Subjective:         Social information:   Living Situation:single family home   Occupation:teacher and retired   Work Status:NA   Equipment Available: None    History of Present Illness:    Sherita is a 79 y.o. female who presents to therapy today with complaints of increased left arm swelling with hx of left breast cancer with chemotherapy and radiation following a single mastectomy. She has been treated for lymphedema in the past, recently she had been stung by a bee in sept 17th and got an infection in it. She has had other infections in the past. She had cancer 16 years ago, she has also had an accident in which she fractured her left clavicle and then has had her left shoulder replaced. Patient is compliant with wearing her sleeve, has a night time garment and a flexitouch at home which she uses 1-2 times  per week.       Pain Ratin  Pain rating at best: 0  Pain rating at worst: 3  Pain description: aching    Patient reports benefit from:  rest  , movement or exercise , self care, compression garments       Objective:      Patient Outcome Measures :    Lymphedema Life Impact Score (%): 19     Scores range from %, where a score of 20% represents the least impact on the individual's life (minimal physical, psychosocial and functional concerns).     Right Upper Extremity Total Estimated Volume (cm3): 1246.58  Left Upper Extremity Total Estimated Volume (cm3): 1453.69  Upper Extremity Swelling Comparison (%): 14.25 %    Upper Extremity Lymphedema  10/10/2019   cm to wrist (cm) 8   cm to Tip of 3rd Finger 10   R Thumb (cm) 7.5   R Index (cm) 7   R Middle (cm) 6.5   R Ring (cm) 5.5   R Little (cm) 4.8   R --cm to smallest wrist 14.5   R 8cm Proximal to Wrist (cm) 14.4   R 16cm Proximal to Wrist (cm) 19.6   R 24cm Proximal to Wrist (cm) 20.8   R 32cm Proximal to Wrist (cm) 21.4   R 40cm Proximal to Wrist (cm) 24.8   Right Upper Extremity Total Estimated Volume (cm3) 1246.58   L Thumb (cm) 7.5   L Index (cm) 7   L Middle (cm) 6.5   L Ring (cm) 6   L Little (cm) 4.5   L --cm to smallest wrist (cm) 14   L 8cm Proximal to Wrist (cm) 16.7   L 16cm Proximal to Wrist (cm) 21.5   L 24cm Proximal to Wrist (cm) 22   L 32cm Proximal to Wrist (cm) 24   L 40cm Proximal to Wrist (cm) 25.5   Left Upper Extremity Total Estimated Volume (cm3) 1453.69   Swelling Description Left>Right   Upper Extremity Swelling Comparison (%) 14.25       Examination  1. Lymphedema of left arm     2. Shoulder joint contracture, left     3. Scar condition and fibrosis of skin     4. Muscle weakness (generalized)       Involved side: Left  Posture Observation:      Cervical:  Mild forward head  Shoulder/Thoracic complex: Mildly increased CT junction thoracic kyphosis    Surgery: Mastectomy:  Left  Treatments:  Chemotherapy  Radiation  Precautions/Restrictions: reverse total shoulder on left side   Involved area: Left Arm  Edema: Minimal and Moderate  Induration: No  Fibrosis: moderate  Temperature: Normal  Sensation: Normal  Skin color: Normal  Skin texture: Normal  Muscle tone: Atrophy    Shoulder ROM  PROM  Flexion to 95 deg  Abduction 90 deg  Moderate decrease in IR and ER noted         Treatment Today   10/10/2019  TREATMENT MINUTES COMMENTS   Evaluation 30    Self-care/ Home management     Manual therapy 30 Discussed increase use of the flexitouch to most day/daily  Self bandaging, use of garments daytime and night time  Given handout on HEP and reviewed them  Medical compression bandaging to the left arm, tubular compression with doubling in the forearm then comprilan 10 x 5 x 1 roll from forearm to upper arm.      Neuromuscular Re-education     Therapeutic Activity     Therapeutic Exercises     Gait training     Modality__________________                Total 60     Blank areas are intentional and mean the treatment did not include these items.   PT Evaluation Code: (Please list factors)  Patient History/Comorbidities: as above   Examination: as above   Clinical Presentation: stable   Clinical Decision Making: low     Patient History/  Comorbidities Examination  (body structures and functions, activity limitations, and/or participation restrictions) Clinical Presentation Clinical Decision Making (Complexity)   No documented Comorbidities or personal factors 1-2 Elements Stable and/or uncomplicated Low   1-2 documented comorbidities or personal factor 3 Elements Evolving clinical presentation with changing characteristics Moderate   3-4 documented comorbidities or personal factors 4 or more Unstable and unpredictable High     Florence Drew, PT, DPT CLT   10/10/2019  7:01 AM

## 2021-06-02 NOTE — PROGRESS NOTES
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 Lymphedema of left arm I89.0    2. 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 Scar condition and fibrosis of skin L90.5    4. Muscle weakness (generalized) M62.81      Initial Assessment   Sherita Borden is a 79 y.o. female who presents to therapy today with chief complaints of left arm edema following a recent bee string infection and a hx of lymphedema after left breast cancer with mastectomy, radiation and chemotherapy. Patient has garments for daytime and night time and has a flexitouch for at home. There is a 14.25% increase in her left arm most noteably in the left forearm and just above the elbow area. The patient will benefit from skilled therapy to decrease edema, decrease tightness in order to decrease risk for infections and improve quality of like and body image,     Assessment:     HEP/POC compliance is  good .  Patient demonstrates understanding/independence with home program.    Goal Status:  No data recorded  Patient will have a decreased volume in : UE;by 5%;to decrease risk of infection;for better fit of clothing;for improved body image;in 6 weeks  Patient will have decreased fibrosis, scar tissue for improved lymphatic mobilitiy : in 6 weeks  Patient will perform, verbalize self-management of: skin care;self-monitoring;exercise;massage;self-compression;infection prevention;in 6 weeks      Plan / Patient Education:     Continue with initial plan of care.  Progress with home program as tolerated.  Plan for next visit: continue next week and assess bandages, reviewed HEP, begin MLD and work on fibrosis   Subjective:     Pain Ratin  Feels the arm is better. Worse bandages until Saturday night, then used glove  on hand when swelling was noted, and her garments day time and nighttime when they came off as well as her flexitouch more.   Started her exercises again and they are going well       Objective:       Caregiver present: not today,  was at initial evaluation       Edema: decreased      Volume measurements taken:  yes see flowsheet   Lymphedema Measures:  Right Upper Extremity Total Estimated Volume (cm3): 1246.58  Left Upper Extremity Total Estimated Volume (cm3): 1431.34  Right UE change of volume from initial (%): 0  Left UE change of volume from initial (%): -1.54  Upper Extremity Swelling Comparison (%): 12.91 %    Skin condition is:  decreased firmness especially after MLD      Compression: none on this AM, brings in wraps      Medication for infection: none       Treatment Today   10/15/2019  TREATMENT MINUTES COMMENTS   Evaluation     Self-care/ Home management     Manual therapy 53 Measurements taken today see above     Manual Therapy: Manual lymph drainage for left upper extremity:  Neck effleurage, short neck, shoulder collectors, right axilla, left inguinal, anterior axilla to axilla anastomosis from left to right, anterior axilla to in left inguinal anastomosis, left upper extremity, anterior axilla to axilla, neck effleurage.  Medical compression bandaging to the left arm, tubular compression with a foam pad on forearm and then comprilan 10 x 5 x 1 roll from forearm to upper arm.   Instructed to use her compression glove she has for hand if swelling is noted  Discussed skin care, exercises, use of compression garment and flexitouch when bandages come off.        Neuromuscular Re-education     Therapeutic Activity     Therapeutic Exercises     Gait training     Modality__________________                Total 53    Blank areas are intentional and mean the treatment did not include these items.     Florence Drew, PT, DPT, CLT   10/15/2019

## 2021-06-02 NOTE — PROGRESS NOTES
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 Lymphedema of left arm I89.0    2. 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 Scar condition and fibrosis of skin L90.5    4. Muscle weakness (generalized) M62.81      Initial Assessment   Sherita Borden is a 79 y.o. female who presents to therapy today with chief complaints of left arm edema following a recent bee string infection and a hx of lymphedema after left breast cancer with mastectomy, radiation and chemotherapy. Patient has garments for daytime and night time and has a flexitouch for at home. There is a 14.25% increase in her left arm most noteably in the left forearm and just above the elbow area. The patient will benefit from skilled therapy to decrease edema, decrease tightness in order to decrease risk for infections and improve quality of like and body image,     Assessment:     HEP/POC compliance is  good .  Patient demonstrates understanding/independence with home program.    Goal Status:  No data recorded  Patient will have a decreased volume in : UE;by 5%;to decrease risk of infection;for better fit of clothing;for improved body image;in 6 weeks  Patient will have decreased fibrosis, scar tissue for improved lymphatic mobilitiy : in 6 weeks  Patient will perform, verbalize self-management of: skin care;self-monitoring;exercise;massage;self-compression;infection prevention;in 6 weeks      Plan / Patient Education:     Continue with initial plan of care.  Progress with home program as tolerated.  Plan for next visit:  continue next week and assess bandages, reviewed HEP, begin MLD and work on fibrosis   Subjective:     Pain Ratin   The foam almost made the arm seem more swollen after taking the wraps off,  tighten the night garment, using a tighter sleeve now and has to use a glove or gauntlet to keep the hand from increasing      Objective:       Caregiver present: not today,  was at initial evaluation       Edema: decreased      Volume measurements taken:  yes see flowsheet   Lymphedema Measures:  Right Upper Extremity Total Estimated Volume (cm3): 1246.58  Left Upper Extremity Total Estimated Volume (cm3): 1411.16  Right UE change of volume from initial (%): 0  Left UE change of volume from initial (%): -2.93  Upper Extremity Swelling Comparison (%): 11.66 %    Upper Extremity Lymphedema  10/10/2019 10/15/2019 10/22/2019 10/30/2019   cm to wrist (cm) 8 8 8 8   cm to Tip of 3rd Finger 10 10 10 10   R Thumb (cm) 7.5 7.5 7.5 7.5   R Index (cm) 7 7 7 7   R Middle (cm) 6.5 6.5 6.5 6.5   R Ring (cm) 5.5 5.5 5.5 5.5   R Little (cm) 4.8 4.8 4.8 4.8   R --cm to smallest wrist 14.5 14.5 14.5 14.5   R 8cm Proximal to Wrist (cm) 14.4 14.4 14.4 14.4   R 16cm Proximal to Wrist (cm) 19.6 19.6 19.6 19.6   R 24cm Proximal to Wrist (cm) 20.8 20.8 20.8 20.8   R 32cm Proximal to Wrist (cm) 21.4 21.4 21.4 21.4   R 40cm Proximal to Wrist (cm) 24.8 24.8 24.8 24.8   Right Upper Extremity Total Estimated Volume (cm3) 1246.58 1246.58 1246.58 1246.58   Right UE change of volume from last visit (%) - 0 0 0   Right UE change of volume from initial (%) - 0 0 0   L Thumb (cm) 7.5 7.5 7.5 7   L Index (cm) 7 7 7 6.5   L Middle (cm) 6.5 6.5 6.5 6   L Ring (cm) 6 6 6 5   L Little (cm) 4.5 4.5 4.5 4.3   L --cm to smallest wrist (cm) 14 14 13.8 13.8   L 8cm Proximal to Wrist (cm) 16.7 16.4 16.3 16   L 16cm Proximal to Wrist (cm) 21.5 22 21.8 21.7   L 24cm Proximal to Wrist (cm) 22 21.5 21.6 21.5   L 32cm Proximal to Wrist (cm) 24 23.5 23.7 24   L 40cm Proximal to Wrist (cm) 25.5 25.5 24.8 24.5   Left Upper Extremity Total Estimated Volume (cm3) 1453.69 1431.34 1418.63 1411.16   Left UE change of volume from last visit (%) - -1.54 -0.89 -0.53    Left UE change of volume from initial (%) - -1.54 -2.41 -2.93   Swelling Description Left>Right Left>Right Left>Right Left>Right   Upper Extremity Swelling Comparison (%) 14.25 12.91 12.13 11.66       Skin condition is:  decreased firmness especially after MLD      Compression: none on this AM, brings in wraps      Medication for infection: none       Treatment Today   10/30/2019  TREATMENT MINUTES COMMENTS   Evaluation     Self-care/ Home management     Manual therapy 35 Measurements taken today see above     Manual Therapy: Manual lymph drainage for left upper extremity:  Neck effleurage, short neck, shoulder collectors, right axilla, left inguinal, anterior axilla to axilla anastomosis from left to right, anterior axilla to in left inguinal anastomosis, left upper extremity, anterior axilla to axilla, neck effleurage.     Instructed to use her compression glove she has for hand if swelling is noted  Discussed skin care, exercises, use of compression garment and flexitouch  Using her garment this week instead of bandages to see if she continues to make improvements        Neuromuscular Re-education     Therapeutic Activity     Therapeutic Exercises     Gait training     Modality__________________                Total 35    Blank areas are intentional and mean the treatment did not include these items.     Florence Drew, PT, DPT, CLT   10/30/2019

## 2021-06-02 NOTE — PROGRESS NOTES
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 Lymphedema of left arm I89.0    2. 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 Scar condition and fibrosis of skin L90.5    4. Muscle weakness (generalized) M62.81      Initial Assessment   Sherita Borden is a 79 y.o. female who presents to therapy today with chief complaints of left arm edema following a recent bee string infection and a hx of lymphedema after left breast cancer with mastectomy, radiation and chemotherapy. Patient has garments for daytime and night time and has a flexitouch for at home. There is a 14.25% increase in her left arm most noteably in the left forearm and just above the elbow area. The patient will benefit from skilled therapy to decrease edema, decrease tightness in order to decrease risk for infections and improve quality of like and body image,     Assessment:     HEP/POC compliance is  good .  Patient demonstrates understanding/independence with home program.    Goal Status:  No data recorded  Patient will have a decreased volume in : UE;by 5%;to decrease risk of infection;for better fit of clothing;for improved body image;in 6 weeks  Patient will have decreased fibrosis, scar tissue for improved lymphatic mobilitiy : in 6 weeks  Patient will perform, verbalize self-management of: skin care;self-monitoring;exercise;massage;self-compression;infection prevention;in 6 weeks      Plan / Patient Education:     Continue with initial plan of care.  Progress with home program as tolerated.  Plan for next visit:  continue next week and assess bandages, reviewed HEP, begin MLD and work on fibrosis   Subjective:     Pain Ratin   The foam almost made the arm seem more swollen after taking the wraps off,  tighten the night garment, using a tighter sleeve now and has to use a glove or gauntlet to keep the hand from increasing      Objective:       Caregiver present: not today,  was at initial evaluation       Edema: decreased      Volume measurements taken:  yes see flowsheet   Lymphedema Measures:  Right Upper Extremity Total Estimated Volume (cm3): 1246.58  Left Upper Extremity Total Estimated Volume (cm3): 1418.63  Right UE change of volume from initial (%): 0  Left UE change of volume from initial (%): -2.41  Upper Extremity Swelling Comparison (%): 12.13 %    Skin condition is:  decreased firmness especially after MLD      Compression: none on this AM, brings in wraps      Medication for infection: none       Treatment Today   10/22/2019  TREATMENT MINUTES COMMENTS   Evaluation     Self-care/ Home management     Manual therapy 53 Measurements taken today see above     Manual Therapy: Manual lymph drainage for left upper extremity:  Neck effleurage, short neck, shoulder collectors, right axilla, left inguinal, anterior axilla to axilla anastomosis from left to right, anterior axilla to in left inguinal anastomosis, left upper extremity, anterior axilla to axilla, neck effleurage.     Instructed to use her compression glove she has for hand if swelling is noted  Discussed skin care, exercises, use of compression garment and flexitouch  Using her garment this week instead of bandages to see if she continues to make improvements        Neuromuscular Re-education     Therapeutic Activity     Therapeutic Exercises     Gait training     Modality__________________                Total 53    Blank areas are intentional and mean the treatment did not include these items.     Florence Drew, PT, DPT, CLT   10/22/2019

## 2021-06-03 VITALS
TEMPERATURE: 98.1 F | HEIGHT: 61 IN | SYSTOLIC BLOOD PRESSURE: 118 MMHG | RESPIRATION RATE: 16 BRPM | BODY MASS INDEX: 19.45 KG/M2 | DIASTOLIC BLOOD PRESSURE: 70 MMHG | WEIGHT: 103 LBS | HEART RATE: 56 BPM

## 2021-06-03 NOTE — PROGRESS NOTES
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 Lymphedema of left arm I89.0    2. 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 Scar condition and fibrosis of skin L90.5    4. Muscle weakness (generalized) M62.81      Initial Assessment   Sherita Borden is a 79 y.o. female who presents to therapy today with chief complaints of left arm edema following a recent bee string infection and a hx of lymphedema after left breast cancer with mastectomy, radiation and chemotherapy. Patient has garments for daytime and night time and has a flexitouch for at home. There is a 14.25% increase in her left arm most noteably in the left forearm and just above the elbow area. The patient will benefit from skilled therapy to decrease edema, decrease tightness in order to decrease risk for infections and improve quality of like and body image,     Assessment:   She has reoduced some since initial therapy. Patient reports she is compliant with self cares and feels ok with the progress overall. pateint will trial at home until after her follow up with Dr Mcneal, if she does not return to therapy then she will be discharged. If she returns will consider bandaging her arm further.       HEP/POC compliance is  good .  Patient demonstrates understanding/independence with home program.    Goal Status:  No data recorded  Patient will have a decreased volume in : UE;by 5%;to decrease risk of infection;for better fit of clothing;for improved body image;in 6 weeks  Patient will have decreased fibrosis, scar tissue for improved lymphatic mobilitiy : in 6 weeks  Patient will perform, verbalize self-management of: skin care;self-monitoring;exercise;massage;self-compression;infection  prevention;in 6 weeks      Plan / Patient Education:     Continue with initial plan of care.  Progress with home program as tolerated.  Plan for next visit:  hold chart x 6 weeks, possible DC if no return. continue next week and assess bandages, reviewed HEP, begin MLD and work on fibrosis   Subjective:     Pain Ratin   The arm is about the same, she has been compliant with her HEP. No pain nor does the arm bother her.       Objective:       Caregiver present: not today,  was at initial evaluation       Edema: decreased      Volume measurements taken:  yes see flowsheet   Lymphedema Measures:  Right Upper Extremity Total Estimated Volume (cm3): 1246.58  Left Upper Extremity Total Estimated Volume (cm3): 1415.82  Right UE change of volume from initial (%): 0  Left UE change of volume from initial (%): -2.61  Upper Extremity Swelling Comparison (%): 11.95 %    Upper Extremity Lymphedema  10/10/2019 10/15/2019 10/22/2019 10/30/2019 2019   cm to wrist (cm) 8 8 8 8 8   cm to Tip of 3rd Finger 10 10 10 10 10   R Thumb (cm) 7.5 7.5 7.5 7.5 7.5   R Index (cm) 7 7 7 7 7   R Middle (cm) 6.5 6.5 6.5 6.5 6.5   R Ring (cm) 5.5 5.5 5.5 5.5 5.5   R Little (cm) 4.8 4.8 4.8 4.8 4.8   R --cm to smallest wrist 14.5 14.5 14.5 14.5 14.5   R 8cm Proximal to Wrist (cm) 14.4 14.4 14.4 14.4 14.4   R 16cm Proximal to Wrist (cm) 19.6 19.6 19.6 19.6 19.6   R 24cm Proximal to Wrist (cm) 20.8 20.8 20.8 20.8 20.8   R 32cm Proximal to Wrist (cm) 21.4 21.4 21.4 21.4 21.4   R 40cm Proximal to Wrist (cm) 24.8 24.8 24.8 24.8 24.8   Right Upper Extremity Total Estimated Volume (cm3) 1246.58 1246.58 1246.58 1246.58 1246.58   Right UE change of volume from last visit (%) - 0 0 0 0   Right UE change of volume from initial (%) - 0 0 0 0   L Thumb (cm) 7.5 7.5 7.5 7 7   L Index (cm) 7 7 7 6.5 6.5   L Middle (cm) 6.5 6.5 6.5 6 5   L Ring (cm) 6 6 6 5 4.8   L Little (cm) 4.5 4.5 4.5 4.3 4.3   L --cm to smallest wrist (cm) 14 14 13.8 13.8 13.8    L 8cm Proximal to Wrist (cm) 16.7 16.4 16.3 16 16   L 16cm Proximal to Wrist (cm) 21.5 22 21.8 21.7 21.7   L 24cm Proximal to Wrist (cm) 22 21.5 21.6 21.5 21.5   L 32cm Proximal to Wrist (cm) 24 23.5 23.7 24 24   L 40cm Proximal to Wrist (cm) 25.5 25.5 24.8 24.5 24.8   Left Upper Extremity Total Estimated Volume (cm3) 1453.69 1431.34 1418.63 1411.16 1415.82   Left UE change of volume from last visit (%) - -1.54 -0.89 -0.53 0.33   Left UE change of volume from initial (%) - -1.54 -2.41 -2.93 -2.61   Swelling Description Left>Right Left>Right Left>Right Left>Right Left>Right   Upper Extremity Swelling Comparison (%) 14.25 12.91 12.13 11.66 11.95       Skin condition is:  decreased firmness especially after MLD      Compression: none on this AM, brings in wraps      Medication for infection: none       Treatment Today   11/12/2019  TREATMENT MINUTES COMMENTS   Evaluation     Self-care/ Home management     Manual therapy 53 Measurements taken today see above     Manual Therapy: Manual lymph drainage for left upper extremity:  Neck effleurage, short neck, shoulder collectors, right axilla, left inguinal, anterior axilla to axilla anastomosis from left to right, anterior axilla to in left inguinal anastomosis, left upper extremity, anterior axilla to axilla, neck effleurage.     Instructed to use her compression glove she has for hand if swelling is noted  Discussed skin care, exercises, use of compression garment and flexitouch  Using her garment this week instead of bandages to see if she continues to make improvements        Neuromuscular Re-education     Therapeutic Activity     Therapeutic Exercises     Gait training     Modality__________________                Total 53    Blank areas are intentional and mean the treatment did not include these items.     Florence Drew, PT, DPT, CLT   11/12/2019

## 2021-06-04 VITALS
RESPIRATION RATE: 16 BRPM | HEIGHT: 61 IN | BODY MASS INDEX: 19.83 KG/M2 | TEMPERATURE: 97.8 F | SYSTOLIC BLOOD PRESSURE: 94 MMHG | WEIGHT: 105 LBS | HEART RATE: 72 BPM | DIASTOLIC BLOOD PRESSURE: 58 MMHG

## 2021-06-05 VITALS
RESPIRATION RATE: 16 BRPM | WEIGHT: 104.3 LBS | HEIGHT: 61 IN | TEMPERATURE: 98.6 F | SYSTOLIC BLOOD PRESSURE: 124 MMHG | DIASTOLIC BLOOD PRESSURE: 70 MMHG | BODY MASS INDEX: 19.69 KG/M2 | HEART RATE: 60 BPM

## 2021-06-05 NOTE — TELEPHONE ENCOUNTER
Sherita called to cancel her appnt today. She says she wants to reschedule for sometime next week. I called her back and left a VM with the scheduling line for her to call.

## 2021-06-05 NOTE — PROGRESS NOTES
S: I have received Sherita's [employer] Nite Wrap left arm sleeve (size small, regular). RX on-file is current from Dr. Mcneal.    A: I assisted Sherita in donning her compression garment. Once donned, the garment appeared to be fitting well and she stated it was comfortable. She was instructed on the donning and doffing process, the uses of the garment, and how to care for the item. She went home with the one night sleeve.    She is aware of the thirty day exchange policy and will let me know if she has any concerns or issues with this garment.    P: She is to call with any further needs.  Goal is to maintain a home program.

## 2021-06-05 NOTE — PATIENT INSTRUCTIONS - HE
Continue current plan of care as discussed.    For night compression sleeve:  [facility] (Please call to make an appointment)    [facility]  [address], [address]  [place], [place].  Phone: [phone]    [facility]  [address]  [place] [address] [phone]

## 2021-06-05 NOTE — TELEPHONE ENCOUNTER
Sherita called me on 1/16/20 to reschedule her cancelled appnt. We scheduled for next Thursday 1/23 at 9:00 am in [place].

## 2021-06-05 NOTE — TELEPHONE ENCOUNTER
I called Sherita on 1/27 to let her know that her night sleeve is in. We scheduled for 2:30 pm on 1/28 in [place].

## 2021-06-05 NOTE — PROGRESS NOTES
Date of Service: 01/06/20    Date last Seen:  09/27/18    PCP: Sheldon Chowdhury MD    Impression:   1. Left upper extremity post mastectomy lymphedema -doing well  2. Left shoulder contracture and fibrosis with significant tightness in left upper trapezius after shoulder replacement-stable  3. Osteoarthritis left shoulder.  4. Cellulitis left arm history (1/18, 6/13/18)  5. Left breast carcinoma  (2003 mod rad mastectomy, chemo and rad)    Plan:   1. Questions were answered.    2. Has doxycycline and knows how to use it appropriately and is at high risk.  3. Continue present program with flexitouch, exercise and compression.   Needs new night sleeve.  Written for.  Prefers not to have the Velcro type compression.  4. Follow up with me in 6 months, or when needed.  She will watch for recurrent infection.    Time spent with patient 25 minutes, with greater than 50% time in consultation, education and coordination of care.   _____________________________________________________________________     Chief Complaint: Left arm swelling     History of Present Illness: Sherita Borden returns to the [facility] for left arm swelling due to postmastectomy lymphedema complicated by left shoulder replacement after being diagnosed with breast cancer on the left in 2003 treated with modified radical mastectomy followed by chemotherapy and radiation further complicated by reverse left shoulder replacement on January 17, 2017 and recurrent infections in January 2018 and July 2018.  Treatment for her postmastectomy swelling included lymphedema bandaging, compression sleeve, night time compression with bandaging, exercises, massage and bandaging, range of motion and elevation.  When I last saw her she had gotten bitten by a bee and the swelling got worse in the left arm.  I sent her to therapy to try to get the swelling down.  She also was to continue wearing her compression, doing her  exercises and using her Flexitouch.  She is here for follow-up.  She is doing her program and the swelling is under good control.  She needs a new night sleeve.  She does not like the Velcro pieces and would prefer a different type of night garment.  There has been no new numbness, tingling or weakness. There have been no new masses, rashes, or swellings of any other joints. There has been no new unexplained weight loss, loss of appetite, nausea and/or vomiting, shortness of breath, weight loss and chest pain.      Past Medical History:   Diagnosis Date     Asymptomatic Postmenopausal Status     Created by Conversion      Atrial fibrillation (H)      Breast cancer (H) 09/2003     Hx antineoplastic chemotherapy      Hx of radiation therapy      Hypercholesteremia      Osteopenia     Created by Conversion      Personal history of breast cancer 7/2/2015     Post-mastectomy lymphedema syndrome 7/2/2015       Past Surgical History:   Procedure Laterality Date     AORTA SURGERY       BREAST BIOPSY       CLAVICLE SURGERY       HERNIA REPAIR       left shoulder replacement       MASTECTOMY Left      TENDON REPAIR Left 6/15/15     TOTAL HIP ARTHROPLASTY Right     2x       Current Outpatient Medications   Medication Sig Dispense Refill     acetaminophen (TYLENOL) 500 MG tablet Take 1,000 mg by mouth.       atorvastatin (LIPITOR) 10 MG tablet Take 10 mg by mouth daily.  3     BIOTIN ORAL Take 1 tablet by mouth daily.       biotin-keratin 10,000-100 mcg-mg Tab Take by mouth.       CALCIUM CITRATE ORAL Take 1,000 mg by mouth 2 times a day at 6:00 am and 4:00 pm.       calcium-mag-vit B6-D3-minerals 221-81-3-125 td-ls-bh-unit Tab Take 1 tablet by mouth 2 (two) times a day.       cholecalciferol, vitamin D3, 400 unit Tab Take 1,000 Units by mouth daily.       docusate sodium (COLACE) 100 MG capsule Take 100 mg by mouth.       flecainide (TAMBOCOR) 50 MG tablet Take 50 mg by mouth.       metoprolol tartrate (LOPRESSOR) 25 MG tablet  Take 25 mg by mouth 2 (two) times a day.       multivitamin-minerals-lutein (CENTRUM SILVER) tablet Take 1 tablet by mouth.       polyethylene glycol (MIRALAX) 17 gram packet Take 17 g by mouth.       rivaroxaban (XARELTO) 20 mg Tab TAKE 1 TABLET BY MOUTH DAILY.       senna-docusate (PERICOLACE) 8.6-50 mg tablet Take 1 tablet by mouth 2 (two) times a day.       No current facility-administered medications for this visit.        Allergies   Allergen Reactions     Dicloxacillin Sodium Hives     Clindamycin Nausea And Vomiting     Penicillins Hives     Atorvastatin Unknown       Social History     Socioeconomic History     Marital status:      Spouse name: Not on file     Number of children: Not on file     Years of education: Not on file     Highest education level: Not on file   Occupational History     Not on file   Social Needs     Financial resource strain: Not on file     Food insecurity:     Worry: Not on file     Inability: Not on file     Transportation needs:     Medical: Not on file     Non-medical: Not on file   Tobacco Use     Smoking status: Never Smoker     Smokeless tobacco: Never Used   Substance and Sexual Activity     Alcohol use: No     Drug use: No     Sexual activity: Yes     Partners: Male     Birth control/protection: Post-menopausal   Lifestyle     Physical activity:     Days per week: Not on file     Minutes per session: Not on file     Stress: Not on file   Relationships     Social connections:     Talks on phone: Not on file     Gets together: Not on file     Attends Christianity service: Not on file     Active member of club or organization: Not on file     Attends meetings of clubs or organizations: Not on file     Relationship status: Not on file     Intimate partner violence:     Fear of current or ex partner: Not on file     Emotionally abused: Not on file     Physically abused: Not on file     Forced sexual activity: Not on file   Other Topics Concern     Not on file   Social  History Narrative    .  2 children.  Retired .       Family History   Problem Relation Age of Onset     Corneal abrasion Mother      Arthritis Mother      COPD Father      Arthritis Father      Breast cancer Neg Hx        Review of Systems:  Review of systems is otherwise negative, except as noted above.  Full 12 point review of systems was completed.    Imaging:    I personally reviewed the following imaging results today and those on care everywhere, if indicated    EXAM: CT ABD PELVIS W IV CONT    LOCATION: [facility]    DATE/TIME: 11/15/2019 8:51 AM        INDICATION: Palpable abdominal mass. Epigastric pain.    COMPARISON: CT chest, abdomen and pelvis 05/08/2018.    TECHNIQUE: Helical enhanced thin-section CT scan of the abdomen and pelvis was performed following injection of IV contrast. Multiplanar reformats were obtained. Dose reduction techniques were used.    CONTRAST: IOHEXOL 350 MG/ML IV SOLN 60 mL        FINDINGS:     LOWER CHEST: Minimal atelectasis at the left lung base. No effusion. Heart is enlarged. No pericardial effusion.        HEPATOBILIARY: Cyst in the left hepatic lobe on image 10 measuring 11 mm, unchanged. Gallbladder contains stones. Common bile duct is normal in caliber.        PANCREAS: Normal.        SPLEEN: Normal.        ADRENAL GLANDS: Normal.        KIDNEYS/BLADDER: Normal.        BOWEL: Diverticulosis without evidence of diverticulitis. Small bowel loops are normal in caliber. Anterior abdominal wall hernia contain a knuckle of small bowel.        LYMPH NODES: No retroperitoneal adenopathy. No pelvic adenopathy.        VASCULATURE: Aorta is normal in caliber.        PELVIC ORGANS: Streak artifact from bilateral hip arthroplasties makes it difficult to fully visualize the pelvic organs.        OTHER: None.        MUSCULOSKELETAL: Bilateral hip arthroplasties. Osteopenia. Degenerative changes in the spine.        IMPRESSION:     1.   Cholelithiasis without evidence of cholecystitis.        2.  There is a midline abdominal wall hernia containing a knuckle of small bowel. No mass lesions are visualized.        3.  Diverticulosis without evidence of diverticulitis.      RIGHT FULL FIELD DIGITAL SCREENING MAMMOGRAM WITH TOMOSYNTHESIS     Performed on: 2/5/19  Compared to: 11/27/2017 Mammo Screening Right, 11/18/2016 Mammo Screening Right, 11/17/2015 Mammo Screening Right, 11/06/2014 Mammo Screening Right, 10/07/2013 Mammo Screening Right, 10/01/2012 Mammo Screening Right, 09/28/2011 Mammo Screening Right, 09/22/2010 Mammo Screening Right, 09/17/2009 Mammo Screening Right, and 09/15/2008 Mammo Screening Right     Findings: The patient is status post left mastectomy. The right breast has scattered areas of fibroglandular densities. There is no radiographic evidence of malignancy.  This study was evaluated with the assistance of Computer-Aided Detection. Breast Tomosynthesis was used in interpretation. Repeat routine screening mammogram in one year is recommended.      ACR BI-RADS Category 1: Negative    Labs:    I personally reviewed the following lab results today and those on care everywhere, if indicated    11/15/2019 GFR greater than 60 creatinine 0.9    Physical Exam:  Vitals:    01/06/20 1431   BP: 94/58   Pulse: 72   Resp: 16   Temp: 97.8  F (36.6  C)     BMI 19.84 (stable)  105 pounds     Circumferential measures:    Vasc Edema 6/12/2017 6/11/2018 9/27/2018 9/26/2019 1/6/2020   Right-just above MCP 17.6 18 18 18 17.5   Right Wrist 14 14 13.5 13.5 14   Right Up 10cm 16.2 18.5 18.5 18.5 18.7   Right Up 10cm From Elbow 23.2 24.1 25 25.2 24.7   Left-just above MCP 17.4 17.2 17.6 17.2 16.3   Left Wrist 14.5 14.5 14 13.3 13.5   Left Up 10cm 18.3 20.2 20.5 20.5 19.4   Left Up 10cm From Elbow 25.9 26.2 27 26.5 26.2     Swelling measures are excellent.    General:  79 y.o. female in no apparent distress.      Psych:  Alert and oriented x 3.   Cooperative. Affect normal.    HEENT: Atraumatic and normocephalic.    Musculoskeletal:  Range of motion in shoulders, elbows and wrists is normal except for continued decreased motion in the left shoulder to 90 degrees abduction and 95 degrees forward flexion. There is no active joint synovitis, erythema, swelling or joint laxity.      Neurological:  Sensation is intact to pinprick and light touch throughout the upper extremities bilaterally.  Strength testing is normal in shoulder abduction, elbow flexion, elbow extension, wrist extension, hand intrinsics bilaterally. Biceps, triceps and brachioradialis reflexes normal bilaterally.     Vascular: Radial arterial pulses are strong and equal at the wrists bilaterally.  There is no unusual venous distention. Decreased venous pattern in left forearm with fibrosis.      Integumentary: Skin of the arms is warm,dry and uniform without rubor, calor or pain to palpation. Stemmer's sign in the hand is negative.   No pain to palpation.      Lymph node exam: There is no cervical, supraclavicular, infraclavicular, or axillary lymphadenopathy on either side noted.    Janett Mcneal MD, EDITH, FAAPMR  Medical Director Wound Care and Lymphedema  [facility]  [phone]

## 2021-06-05 NOTE — PROGRESS NOTES
"S: Patient seen in [place] be measured for a new night-time compression arm sleeve.  She has an Rx from Dr. Mcneal to treat her left-sided lymphedema.  She reports her swelling as down and remaining down over the years.    O: Goal is to evaluate and measure patient for a compression garment that will help control her lymphedema. The expected outcome with compliant use is to reduce swelling and control patients condition.      A: Sherita brought along her old Sigvaris velcro sleeve that is discontinued. I showed her the [employer] Solar[employer] Tribtue Nite Wrap arm sleeve and she really liked this. I took measurements for the garment (size small) and placed an order through [employer]. She also showed me an older arm sleeve donning device called an \"Easy Slide\" in the color red. She would like to purchase a new one OOP if it's not too expensive. I let her know I would look up an option and call her with the price quote before ordering.    P: I will call Sherita when her item(s) are in to set up a delivery appnt.  "

## 2021-06-06 NOTE — TELEPHONE ENCOUNTER
Pts  wanted to update Dr. Mnceal that pt has been hospitalized for L arm cellulitis. A culture has been done of her arm and a biopsy of her spine. LOV with Dr. Mcneal was 1/6/20.

## 2021-06-06 NOTE — TELEPHONE ENCOUNTER
Diane,  from [facility] called. Patient has been hospitalized with osteomyelitis and is being discharged tomorrow. She will need 6 weeks of IV antibiotics.     Currently due to the strike, [facility] outpatient infusion is not accepting patients. Diane contacted [facility] outpatient infusion but was told they are only accepting current [facility] patients.    Diane wondering since patient is currently being treated for lymphedema by Dr. Mcneal, if our clinic can help coordinate this, although she is aware that Dr. Mcneal would not be managing the IV antibiotics.    Diane would like a call back at [phone].

## 2021-06-08 NOTE — PROGRESS NOTES
[facility] Daily Progress     Patient Name: Sherita Borden  Date: 1/23/2017  Visit #: 3/8  PTA visit #: 0  Date of evaluation: 11/21/2016  Referral Diagnosis:1) Swelling: Lymphatic; Left arm, Post mastectomy/lumpectomy/LND and left shoulder pain, contracture with rotator cuff tear Dr. Mcneal  2) Left shoulder advanced arthritis-presurgical eval and exercises/HEP instruction Dr. Christianson  Referring provider:Janett Mcneal MD and Dr. Kaitlin Christianson      Precautions/Restrictions: No diathermy/US (painfree rom)       Visit Diagnosis:     ICD-10-CM    1. Pain in joint of left shoulder M25.512    2. Stiffness of left shoulder joint M25.612    3. Lymphedema of left arm I89.0    4. Generalized muscle weakness M62.81          Assessment:   Pt less than a week out of surgery (TSA) having increased left arm swelling.  Pt seen today for wrapping only.  She tolerated it well.  Pt is appropriate for continued compression wraps while recuperating from surgery.  Exercises will be suspended until cleared by the surgeon.  Patient is appropriate to continue with skilled physical therapy intervention, as indicated by initial plan of care.    Goal Status:  Pt. will demonstrate/verbalize independence in self-management of condition in : 6 weeks;Met  Pt. will be independent with home exercise program in : 6 weeks;Met  Pt. will improve posture : and demonstrate posture with minimal to no cuing;in 6 weeks;Met  Patient will have a decreased volume in : left;UE;by 5%;by 10%;in 6 weeks;to decrease risk of infection;Partially met  Patient will perform, verbalize self-management of: skin care;self-monitoring;exercise;massage;self-compression;compression wear;compression care;infection prevention;in 6 weeks;Met    Plan for next visits:      Continue wrapping left UE with noted restrictions due to left TSA on 1/17/17.  No exercises until pt cleared by surgeon.    Subjective:   Surgery (reverse total shoulder 1/17/17).  She did  get some lymphedema treatment in the hospital.  Pain Ratin       Objective:     No measurements taken due to restrictions of surgery.   was present for wrapping and questions were answered.    Treatment Today      TREATMENT MINUTES COMMENTS   Evaluation     Self-care/ Home management 35 Discussed POC.  Lotion lower arm  Ortho stockinette: knuckles to axilla  Comprilan  4x5,6x5,8x5   Manual therapy     Neuromuscular Re-education     Therapeutic Activity     Therapeutic Exercises     Gait training     Modality__________________                Total 35    Blank areas are intentional and mean the treatment did not include these items.     Margaret Chamberlain PT, CLT  2017  2:03 PM

## 2021-06-08 NOTE — PROGRESS NOTES
[facility] Daily Progress     Patient Name: Sherita Borden  Date: 2017  Visit #: 4/8  PTA visit #: 0  Date of evaluation: 2016  Referral Diagnosis:1) Swelling: Lymphatic; Left arm, Post mastectomy/lumpectomy/LND and left shoulder pain, contracture with rotator cuff tear Dr. Mcneal  2) Left shoulder advanced arthritis-presurgical eval and exercises/HEP instruction Dr. Christianson  Referring provider:Janett Mcneal MD and Dr. Kaitlin Christianson      Precautions/Restrictions: No diathermy/US (painfree rom)       Visit Diagnosis:     ICD-10-CM    1. Pain in joint of left shoulder M25.512    2. Stiffness of left shoulder joint M25.612    3. Lymphedema of left arm I89.0    4. Generalized muscle weakness M62.81    5. Shoulder joint contracture, left M24.512          Assessment:   Swelling down today in forearm.  Upper arm soft.    Patient is appropriate to continue with skilled physical therapy intervention, as indicated by initial plan of care.    Goal Status:  Pt. will demonstrate/verbalize independence in self-management of condition in : 6 weeks;Met  Pt. will be independent with home exercise program in : 6 weeks;Met  Pt. will improve posture : and demonstrate posture with minimal to no cuing;in 6 weeks;Met  Patient will have a decreased volume in : left;UE;by 5%;by 10%;in 6 weeks;to decrease risk of infection;Partially met  Patient will perform, verbalize self-management of: skin care;self-monitoring;exercise;massage;self-compression;compression wear;compression care;infection prevention;in 6 weeks;Met    Plan for next visits:   Remeasure  Continue with initial plan of care.   Pt to keep bandages on until tomorrow night and then use the night garment.  Patient to alternate wrap and night garment as needed.    Subjective:   Trying to decrease pain meds.  Not feeling as well today.  Pain Ratin       Objective:   Caregiver present: Yes    Observation of swelling: Left UE is better.     Volume  measurements taken:  No      Skin condition is:  better    Compression:  No compression.       Treatment Today      TREATMENT MINUTES COMMENTS   Evaluation     Self-care/ Home management 20 Left arm:  -Lotion   -Stockinette  -Foam at base of thumb and around elbow due to irritation  -Comprilan 4x5,6x5,8x5  Tube G on top   Manual therapy 35 MLT trunk, neck and left UE (avoiding the anterior shoulder)   Neuromuscular Re-education     Therapeutic Activity     Therapeutic Exercises     Gait training     Modality__________________                Total 55    Blank areas are intentional and mean the treatment did not include these items.     Margaret Chamberlain PT, CLT  1/25/2017  2:05 PM

## 2021-06-08 NOTE — PROGRESS NOTES
[facility] Daily Progress     Patient Name: Sherita Borden  Date: 1/30/2017  Visit #: 5/8  PTA visit #: 0  Date of evaluation: 11/21/2016  Referral Diagnosis:1) Swelling: Lymphatic; Left arm, Post mastectomy/lumpectomy/LND and left shoulder pain, contracture with rotator cuff tear Dr. Mcneal  2) Left shoulder advanced arthritis-presurgical eval and exercises/HEP instruction Dr. Christianson  Referring provider:Janett Mcneal MD and Dr. Kaitlin Christianson      Precautions/Restrictions: No diathermy/US (painfree rom)       Visit Diagnosis:     ICD-10-CM    1. Pain in joint of left shoulder M25.512    2. Stiffness of left shoulder joint M25.612    3. Lymphedema of left arm I89.0    4. Generalized muscle weakness M62.81    5. Shoulder joint contracture, left M24.512          Assessment:   Pt is s/p RSA.  Pt doing well.  No orders regarding TSA yet.  Patient continues to respond well to lymphedema treatment.  Most of the swelling appears to be above the elbow which may still be from surgery.  HEP/POC compliance is  good .  Patient demonstrates understanding/independence with home program.    Goal Status:  Pt. will demonstrate/verbalize independence in self-management of condition in : 6 weeks;Progressing toward  Pt. will be independent with home exercise program in : 6 weeks;Met  Pt. will improve posture : and demonstrate posture with minimal to no cuing;in 6 weeks;Met  Patient will have a decreased volume in : left;UE;by 5%;by 10%;in 6 weeks;to decrease risk of infection;Progressing toward  Patient will perform, verbalize self-management of: skin care;self-monitoring;exercise;massage;self-compression;compression wear;compression care;infection prevention;in 6 weeks;Met    Plan for next visits:     Continue MLT and compression    Subjective:   Sore after last visit but swelling is down.  Tried the night garment.  Compression hard to tolerate so far.  Trying to keep compression on as much as tolerated.  Pain  Ratin-10/10.       Objective:        Date 2016   Position         Side Right Left Right Left Right Left   Thumb 5.8 5.2 5.9 5.3 6 5.4   Index 5.7 5.5 5.8 5.7 5.6 5.5   Middle 5.5 5 5.3 5.3 5.3 5   Ring 5.6 4.9 5.1 4.8 5 4.8   Little 4.5 4.3 4.5 4.3 4.4 4.4   ___10_cm from 3rd fingertip 18.3 17.4 18.3 17 18.2 16.2   __8__cm to wrist 14.3 14.4 14.5 14.5 14.3 14   8 cm proximal to wrist 15.7 17.7 14.9 18.1 14.9 15.4   16 cm proximal to wrist 20.4 22.8 20.2 22.9 20.4 22.3   24 cm proximal to wrist 20.1 22.7 20.7 22.1 20.6 25.9   32 cm proximal to wrist 23.6 26.3 22.7 24.8 23 27.4   40 cm proximal to wrist 26.7 26.9 26.2 26 26.5 28   Total estimated volume 1320.32419 1589.02799 1282.213011 1514.1791 1296.551179 2740.64642   Swelling Left>Right 16.0200347 Left>Right 15.5959864 Left>Right 22.1902462   % Change of volume from last visit  -2.38683251 -4.7780622 1.954978260 10.4237218   % Change of volume from initial visit  -2.93920046 -4.1455897 -1.718897200 5.78401534    Note a negative % change indicates a decrease in volume          Treatment Today      TREATMENT MINUTES COMMENTS   Evaluation     Self-care/ Home management 15 Lotion  Stockinette  Foam at wrist to protect thumb joint  Comprilan 4x5,6x5,8x5.  Tube G on top   Manual therapy 35 MLT trunk, neck and left UE, routing posteriorly to back   Neuromuscular Re-education     Therapeutic Activity     Therapeutic Exercises     Gait training     Modality__________________                Total 50    Blank areas are intentional and mean the treatment did not include these items.     Margaret Chamberlain PT, CLT  2017  9:02 AM

## 2021-06-08 NOTE — PROGRESS NOTES
[facility] Daily Progress     Patient Name: Sherita Borden  Date: 2017  Visit #:   PTA visit #: 0  Date of evaluation: 2016  Referral Diagnosis:1) Swelling: Lymphatic; Left arm, Post mastectomy/lumpectomy/LND and left shoulder pain, contracture with rotator cuff tear Dr. Mcneal  2) Left shoulder advanced arthritis-presurgical eval and exercises/HEP instruction Dr. Christianson  Referring provider:Janett Mcneal MD and Dr. Kaitlin Christianson      Precautions/Restrictions: No diathermy/US (painfree rom)       Visit Diagnosis:     ICD-10-CM    1. Lymphedema of left arm I89.0          Assessment:   Pt's volumes are down and pt with help of  is compliant.  Patient demonstrates understanding/independence with home program.    Goal Status:     Patient will have a decreased volume in : left;UE;by 5%;by 10%;in 6 weeks;to decrease risk of infection;Partially met  Patient will perform, verbalize self-management of: skin care;self-monitoring;exercise;massage;self-compression;compression wear;compression care;infection prevention;in 6 weeks;Met    Plan for next visits:     Hold PT for lymphedema;  Awaiting orders for Post op RTS    Subjective:   Trying to use the arm for little things.  Off the sling as tolerated.  Staying mostly wrapped  Pain Ratin-9/10  shoulder       Objective:        DA1:I26ate 2016   Position           Side Right Left Right Left Right Left Right Left   Thumb 5.8 5.2 5.9 5.3 6 5.4 6 5.2   Index 5.7 5.5 5.8 5.7 5.6 5.5 5.6 5.4   Middle 5.5 5 5.3 5.3 5.3 5 5.3 4.9   Ring 5.6 4.9 5.1 4.8 5 4.8 5 4.7   Little 4.5 4.3 4.5 4.3 4.4 4.4 4.4 4.4   ___10_cm from 3rd fingertip 18.3 17.4 18.3 17 18.2 16.2 18.2 16.2   __8__cm to wrist 14.3 14.4 14.5 14.5 14.3 14 14.3 14   8 cm proximal to wrist 15.7 17.7 14.9 18.1 14.9 15.4 14.9 16.6   16 cm proximal to wrist 20.4 22.8 20.2 22.9 20.4 22.3 20.4 22   24 cm  proximal to wrist 20.1 22.7 20.7 22.1 20.6 25.9 20.6 24.1   32 cm proximal to wrist 23.6 26.3 22.7 24.8 23 27.4 23 26.9   40 cm proximal to wrist 26.7 26.9 26.2 26 26.5 28 26.5 28.1              Total estimated volume 1320.64334 1589.00626 1282.744796 4716.1791 1296.080848 1607.06991 1296.80905 1622.4361   Swelling Left>Right 16.1691671 Left>Right 15.5632653 Left>Right 22.8293628 Left>Right 20.62650438   % Change of volume from last visit  -2.85170067 -4.8628318 1.434878095 10.7857625 0 -3.81791271   % Change of volume from initial visit  -2.02297523 -4.4337122 -1.827909882 5.18858102 -1.73676197 2.109320437    Note a negative % change indicates a decrease in volume                  Treatment Today      TREATMENT MINUTES COMMENTS   Evaluation     Self-care/ Home management 10 Lotion  Stockinette  Comprilan 4x5,6x5,8x5.   Manual therapy 45 Remeasured volumes  MLT trunk, neck and left UE   Neuromuscular Re-education     Therapeutic Activity     Therapeutic Exercises     Gait training     Modality__________________                Total 55    Blank areas are intentional and mean the treatment did not include these items.     Margaret Chamberlain PT, CLT  2/8/2017  10:40 AM

## 2021-06-09 NOTE — PROGRESS NOTES
[facility] Daily Progress     Patient Name: Sherita Borden  Date: 3/22/2017  Visit #:  PTA visit #: 0  Referral Diagnosis: Reverse Total Shoulder 17  Referring provider: Kaitlin Christianson MD  Precautions / Restrictions : Focus on HEP; AROM;for ADL's; Don't push too much passively; No restrictions on Flexion or ER at side to 40 Degrees; No reaching behind back for 12 weeks.       Visit Diagnosis:     ICD-10-CM    1. 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 Pain in joint of left shoulder M25.512    3. Generalized muscle weakness M62.81          Assessment:   Pt doing well with ROM and strength.  She is concerned about the ache in the shoulder.  Discussed ways to manage the pain with ice/heat/tylenol/rest/ support.  HEP/POC compliance is  good .  Patient demonstrates understanding/independence with home program.  Patient is appropriate to continue with skilled physical therapy intervention, as indicated by initial plan of care.    Goal Status:  Pt. will demonstrate/verbalize independence in self-management of condition in : 12 weeks;Progressing toward  Pt. will be independent with home exercise program in : 12 weeks;Progressing toward  Pt. will report decreased intensity, frequency of : Pain;in 12 weeks;Progressing toward  Pt. will have improved quality of sleep: getting 75-90% of required amount;with less pain;in 12 weeks;Progressing toward  Pt. will improve posture : and demonstrate posture with minimal to no cuing;in 6 weeks;Met  Patient will reach / maintain arm movement: forward;overhead;for home chores;for dressing;with partial ROM;with less pain;in 12 weeks;Progressing toward      Plan for next visits:     Continue with exercise and MT  Pt to add 1-2# at home for supine press.  Add side lying ER    Subjective:   Drove car today.  Pain Ratin-6/10      Objective:   AROM 90 flexion; AAROM : flexion to 100  ER: AAROM to 20 degrees       Treatment Today      TREATMENT MINUTES COMMENTS    Evaluation     Self-care/ Home management     Manual therapy 15 AAROM with hold/stretch flexion and External rotation; light isometric resistance; rhythmic stabilization   Neuromuscular Re-education     Therapeutic Activity     Therapeutic Exercises 12 Exercises:  Exercise #1: Wand press  Comment #1: review: added increased flexion  Exercise #2: Wand ER  Comment #2: Review  Exercise #3: Isometric adduction  Comment #3: Verbal review  Exercise #4: Reverse Codman's  Comment #4: Verbal review  Exercise #5: Isometric ER/IR: x10, 5 sec holds, added to HEP  Comment #5: Table slides: x10, each flexion and horizontal abd/add, added to HEP  Exercise #6: Pulleys  Comment #6: x5 minutes  Exercise #7: UBE  Comment #7: 3 minutes; Level 1     Gait training     Modality__________________                Total 27    Blank areas are intentional and mean the treatment did not include these items.     Margaret Chamberlain PT, CLT  3/22/2017  9:05 AM

## 2021-06-09 NOTE — PATIENT INSTRUCTIONS - HE
Continue present program with flexitouch, exercise and compression.     Please call one of the [place] locations below to schedule an appointment. If you received a prescription please bring it with you to your appointment. Some locations are limited to what they carry.    Office Locations    [facility]  [address] [place] [address]  Home Medical Equipment, [address]   Phone: [phone]   Orthotics and Prosthetics, [address]   Phone: [phone]    [facility]  Home Medical Equipment  [address], [address], [place] [address]   Phone: [phone]    [facility] ([facility])    [address], [address], [place] [address]  Phone: [phone]

## 2021-06-09 NOTE — PROGRESS NOTES
[facility] Daily Progress     Patient Name: Sherita Borden  Date: 2017  Visit #:   PTA visit #: 3  Referral Diagnosis: Reverse Total Shoulder 17  Referring provider: Kaitlin Christianson MD  Precautions / Restrictions : Focus on HEP; AROM;for ADL's; Don't push too much passively; No restrictions on Flexion or ER at side to 40 Degrees; No reaching behind back for 12 weeks.       Visit Diagnosis:     ICD-10-CM    1. 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 Pain in joint of left shoulder M25.512    3. Generalized muscle weakness M62.81          Assessment:   ER still painful after 4 reps.  Pt's pain decreasing some now.  HEP/POC compliance is  good .  Patient demonstrates understanding/independence with home program.  Patient is appropriate to continue with skilled physical therapy intervention, as indicated by initial plan of care.    Goal Status:  Pt. will demonstrate/verbalize independence in self-management of condition in : 12 weeks;Progressing toward  Pt. will be independent with home exercise program in : 12 weeks;Progressing toward  Pt. will report decreased intensity, frequency of : Pain;in 12 weeks;Progressing toward  Pt. will have improved quality of sleep: getting 75-90% of required amount;with less pain;in 12 weeks;Progressing toward  Pt. will improve posture : and demonstrate posture with minimal to no cuing;in 6 weeks;Met  Patient will reach / maintain arm movement: forward;overhead;for home chores;for dressing;with partial ROM;with less pain;in 12 weeks;Progressing toward      Plan for next visits:     Progress with home program as tolerated.   Continue MT and exercise    Subjective:   Ordered a pulley for home.  Ache still present but better.  Driving and getting into and out of the car is the most painful. Seeing surgeon on Monday.  Tight in the upper trap after exercise.  ER exercise is painful.  Pain Ratin  Range: 0-6/10      Objective:   AROM:  90 flexion and 75  abduction;  AAROM 115 flexion       Treatment Today      TREATMENT MINUTES COMMENTS   Evaluation     Self-care/ Home management     Manual therapy 12 STM/MFR and Grade I distraction to joint; AAROM to shoulder   Neuromuscular Re-education     Therapeutic Activity     Therapeutic Exercises 15 Exercises:  Exercise #1: Wand press  Comment #1: review: added increased flexion added 1#  Exercise #2: Wand ER  Comment #2: Review  Exercise #3: Isometric adduction  Comment #3: Verbal review  Exercise #4: Reverse Shakila's  Comment #4: Pt brought in weight from home, about a 2# weight, poor technique and increased pain, so advised to hold on weight  Exercise #5: Isometric ER/IR: x10, 5 sec holds, added to HEP  Comment #5: Table slides: x10, each flexion and horizontal abd/add  Exercise #6: Pulleys  Comment #6: x3 minutes Now has 1 at home, reviewed precautions  Exercise #7: Nustep  Comment #7: 4 minutes Level 3  Exercise #8:  SL ER 0#     Comment #8: x5 reps     Gait training     Modality__________________                Total 27    Blank areas are intentional and mean the treatment did not include these items.     Margaret Chamberlain PT, CLT  4/5/2017  9:33 AM

## 2021-06-09 NOTE — PROGRESS NOTES
Type of service:  Video Visit       Video Start and End Time :  8:41 am - 9:03 am    Originating Location (pt. Location):        Distant Location (provider location):  [facility] [place]         Mode of Communication:  Amwell + D[employer]imcyndie    Date of Service: 07/06/20    Date last Seen:  01/05/20    PCP: Sheldon Chowdhury MD    Impression:   1. Left upper extremity post mastectomy lymphedema -doing well  2. Left shoulder contracture and fibrosis with significant tightness in left upper trapezius after shoulder replacement-stable  3. Osteoarthritis left shoulder.  4. Cellulitis left arm history (1/18, 6/13/18, 3/2020)  5. Left breast carcinoma  (2003 mod rad mastectomy, chemo and rad)  6. H/O vertebral osteomyelitis/discitis L2/3    Plan:   1. Questions were answered.    2. On Duricef daily.  3. Continue present program with flexitouch, exercise and compression. Has new night sleeve which is working well.    Needs new sleeve.    4. Follow up with me in 6 months, or when needed.  She will watch for recurrent infection.  _____________________________________________________________________     Chief Complaint: Left arm swelling     History of Present Illness: Sherita Borden returns to the [facility] for left arm swelling due to postmastectomy lymphedema complicated by left shoulder replacement after being diagnosed with breast cancer on the left in 2003 treated with modified radical mastectomy followed by chemotherapy and radiation further complicated by reverse left shoulder replacement on January 17, 2017 and recurrent infections in January 2018 and July 2018.  Treatment for postmastectomy swelling included lymphedema bandaging, compression sleeve, night time compression with bandaging, exercises, massage and bandaging, night time sleeve, range of motion and elevation with Flexitouch.  At last follow up in January she was doing her program and the swelling  was under good control. Since then she was admitted to [facility] in March with lumbar discitis with concern for vertebral osteomyelitis at L2/3 and left arm cellulitis.  She is now on chronic antibiotics.   There has been no new numbness, tingling or weakness. There have been no new masses, rashes, or swellings of any other joints. There has been no new unexplained weight loss, loss of appetite, nausea and/or vomiting, shortness of breath, weight loss and chest pain.      Past Medical History:   Diagnosis Date     Asymptomatic Postmenopausal Status     Created by Conversion      Atrial fibrillation (H)      Breast cancer (H) 09/2003     Hx antineoplastic chemotherapy      Hx of radiation therapy      Hypercholesteremia      Osteopenia     Created by Conversion      Personal history of breast cancer 7/2/2015     Post-mastectomy lymphedema syndrome 7/2/2015       Past Surgical History:   Procedure Laterality Date     AORTA SURGERY       BREAST BIOPSY       CLAVICLE SURGERY       HERNIA REPAIR       left shoulder replacement       MASTECTOMY Left      TENDON REPAIR Left 6/15/15     TOTAL HIP ARTHROPLASTY Right     2x       Current Outpatient Medications   Medication Sig Dispense Refill     acetaminophen (TYLENOL) 500 MG tablet Take 1,000 mg by mouth.       atorvastatin (LIPITOR) 10 MG tablet Take 10 mg by mouth daily.  3     BIOTIN ORAL Take 1 tablet by mouth daily.       biotin-keratin 10,000-100 mcg-mg Tab Take by mouth.       CALCIUM CITRATE ORAL Take 1,000 mg by mouth 2 times a day at 6:00 am and 4:00 pm.       calcium-mag-vit B6-D3-minerals 016-73-8-125 vj-pm-ze-unit Tab Take 1 tablet by mouth 2 (two) times a day.       cefadroxil (DURICEF) 500 MG capsule Take 500 mg by mouth daily.       cholecalciferol, vitamin D3, 400 unit Tab Take 1,000 Units by mouth daily.       docusate sodium (COLACE) 100 MG capsule Take 100 mg by mouth.       flecainide (TAMBOCOR) 50 MG tablet Take 50 mg by mouth.       metoprolol tartrate  (LOPRESSOR) 25 MG tablet Take 25 mg by mouth 2 (two) times a day.       multivitamin-minerals-lutein (CENTRUM SILVER) tablet Take 1 tablet by mouth.       polyethylene glycol (MIRALAX) 17 gram packet Take 17 g by mouth.       rivaroxaban (XARELTO) 20 mg Tab TAKE 1 TABLET BY MOUTH DAILY.       senna-docusate (PERICOLACE) 8.6-50 mg tablet Take 1 tablet by mouth 2 (two) times a day.       No current facility-administered medications for this visit.        Allergies   Allergen Reactions     Dicloxacillin Sodium Hives     Clindamycin Nausea And Vomiting     Penicillins Hives     Atorvastatin Unknown       Social History     Socioeconomic History     Marital status:      Spouse name: Not on file     Number of children: Not on file     Years of education: Not on file     Highest education level: Not on file   Occupational History     Not on file   Social Needs     Financial resource strain: Not on file     Food insecurity     Worry: Not on file     Inability: Not on file     Transportation needs     Medical: Not on file     Non-medical: Not on file   Tobacco Use     Smoking status: Never Smoker     Smokeless tobacco: Never Used   Substance and Sexual Activity     Alcohol use: No     Drug use: No     Sexual activity: Yes     Partners: Male     Birth control/protection: Post-menopausal   Lifestyle     Physical activity     Days per week: Not on file     Minutes per session: Not on file     Stress: Not on file   Relationships     Social connections     Talks on phone: Not on file     Gets together: Not on file     Attends [employer] service: Not on file     Active member of club or organization: Not on file     Attends meetings of clubs or organizations: Not on file     Relationship status: Not on file     Intimate partner violence     Fear of current or ex partner: Not on file     Emotionally abused: Not on file     Physically abused: Not on file     Forced sexual activity: Not on file   Other Topics Concern     Not on  file   Social History Narrative    .  2 children.  Retired .       Family History   Problem Relation Age of Onset     Corneal abrasion Mother      Arthritis Mother      COPD Father      Arthritis Father      Breast cancer Neg Hx        Review of Systems:  See HPI.    Imaging:    I personally reviewed the following imaging results today and those on care everywhere, if indicated    Interface, In Rad Results - 03/17/2020  5:35 PM CDT  EXAM: MR LUMBAR SPINE W/WO IV CONT  LOCATION: [facility]  DATE/TIME: 3/17/2020 4:45 PM    INDICATION: Discitis  COMPARISON: MRI lumbar spine 02/13/2020  CONTRAST: GADOBUTROL 1 MMOL/ML IV SOLN 4.5 mL  TECHNIQUE: Without and with IV contrast    FINDINGS:   Nomenclature will remain concordant with that utilized on the previous addended lumbar spine report and assumes 5 lumbar type vertebra with partial lumbarization of S1. Unchanged lumbar alignment including thoracal lumbar levocurvature and 4 mm anterolisthesis at L4-L5. Multilevel Schmorl's nodes and chronic superior endplate depression of L2 similar to the previous exam. Extensive endplate edema and enhancement centered at the L2-L3 level. Modic type II endplate signal changes at L3-L4 and L4-L5. Minor Modic type one endplate edema at L5-S1. Normal distal spinal cord and cauda equina with conus medullaris at L1. No evidence for epidural fluid collection or pathologic intradural enhancement. Minor paraspinal inflammatory change centered at L2-L3. No paraspinal fluid collection. Cholelithiasis. Tiny subcentimeter left renal cysts as seen previously. Previous bilateral total hip arthroplasties.    T12-L1: Disc desiccation with minor loss of disc height. Slight annular bulge. Minimal facet arthropathy. No spinal canal or neural foraminal stenosis.     L1-L2: Disc desiccation. Accentuated disc heights centrally. Shallow right central disc protrusion. Mild left facet arthropathy and ligamentum flavum  thickening. No spinal canal stenosis. Minimal bilateral neural foraminal stenosis.    L2-L3: Moderate to advanced loss of disc height, progressed since the prior exam. Mild loss of disc signal. Circumferential disc osteophyte complex. Endplate edema and enhancement as above. Mild to moderate facet arthropathy and ligamentum flavum thickening. Moderate spinal canal stenosis with narrowing of both lateral recesses. Moderate right neural foraminal stenosis. Mild to moderate left neural foraminal stenosis..     L3-L4: Advanced loss of disc height and signal. Circumferential disc osteophyte complex eccentric left similar to the previous exam. Moderate bilateral facet arthropathy. Mild spinal canal stenosis with asymmetric narrowing of the left lateral recess. Mild right neural foraminal stenosis. Mild to moderate left neural foraminal stenosis.    L4-L5: Advanced loss of disc height and signal. Grade 1 anterolisthesis with unroofing of the disc. Slight annular bulge and circumferential endplate spurring. Moderate to advanced facet arthropathy and ligamentum flavum thickening. Mild trefoil type spinal canal stenosis with narrowing of both lateral recesses. No right neural foraminal stenosis. Moderate left neural foraminal stenosis similar to the previous exam.    L5-S1: Advanced loss of disc height and signal. Circumferential disc osteophyte complex. Mild to moderate facet arthropathy and ligamentum flavum thickening. Mild right and moderate left lateral recess stenosis with minimal central spinal canal stenosis. Moderate to severe right neural foraminal stenosis. Mild to moderate left neural foraminal stenosis.    IMPRESSION:  1.  Extensive endplate edema and enhancement concerning for vertebral osteomyelitis centered at L2-L3 similar to findings on 02/13/2020. Some progressive loss of disc height at this level without endplate destruction or epidural/paraspinal abscess formation.  2.  At L2-L3, persistent moderate  spinal canal stenosis with narrowing of both lateral recesses and displacement of traversing L3 nerve roots. Moderate right and mild to moderate left neural foraminal stenosis.  3.  At L3-L4, mild spinal canal stenosis with asymmetric narrowing of the left lateral recess. Mild right and mild to moderate left neural foraminal stenosis.  4.  At L4-L5, mild trefoil type spinal canal stenosis. Moderate left neural foraminal stenosis with displacement of the left L4 nerve root.  5.  At L5-S1, left greater than right lateral recess stenosis with moderate to severe right and mild to moderate left neural foraminal stenosis. Impingement upon right greater the left L5 nerve roots.      Labs:    I personally reviewed the following lab results today and those on care everywhere, if indicated    11/15/2019 GFR greater than 60 creatinine 0.9    Physical Exam:  There were no vitals filed for this visit.  BMI 19.84 (stable)  105 pounds in last week    Circumferential measures:    Vasc Edema 6/12/2017 6/11/2018 9/27/2018 9/26/2019 1/6/2020   Right-just above MCP 17.6 18 18 18 17.5   Right Wrist 14 14 13.5 13.5 14   Right Up 10cm 16.2 18.5 18.5 18.5 18.7   Right Up 10cm From Elbow 23.2 24.1 25 25.2 24.7   Left-just above MCP 17.4 17.2 17.6 17.2 16.3   Left Wrist 14.5 14.5 14 13.3 13.5   Left Up 10cm 18.3 20.2 20.5 20.5 19.4   Left Up 10cm From Elbow 25.9 26.2 27 26.5 26.2     Swelling measures previously.    Exam done per patient under my direction with video.      General:  80 y.o. female in no apparent distress.      Psych:  Alert and oriented x 3.  Cooperative. Affect normal.    HEENT: Atraumatic and normocephalic.    Musculoskeletal:  Range of motion in shoulders, elbows and wrists is normal except for continued decreased motion in the left shoulder to 160 degrees abduction and 160degrees forward flexion. This has improved. There is no active joint synovitis, erythema, swelling or joint laxity.      Neurological:  Sensation is  intact to pinprick and light touch throughout the upper extremities bilaterally.  Strength testing is normal per patient report.    Integumentary: Skin of the arms is warm,dry and uniform without rubor, calor or pain to palpation. No pain to palpation.      Lymph node exam: There is no cervical, supraclavicular, infraclavicular, or axillary lymphadenopathy on either side noted.    Janett Mcneal MD, Diplomate ABWMS, FAAPMR  Medical Director Wound Care and Lymphedema  [facility]  [phone]

## 2021-06-09 NOTE — PROGRESS NOTES
[facility] Daily Progress     Patient Name: Sherita Borden  Date: 3/20/2017  Visit #: 4/12  PTA visit #: 0  Referral Diagnosis: Reverse Total Shoulder 1/17/17  Referring provider: Kaitlin Christianson MD  Precautions / Restrictions : Focus on HEP; AROM;for ADL's; Don't push too much passively; No restrictions on Flexion or ER at side to 40 Degrees; No reaching behind back for 12 weeks.       Visit Diagnosis:     ICD-10-CM    1. 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 Pain in joint of left shoulder M25.512    3. Generalized muscle weakness M62.81          Assessment:     HEP/POC compliance is  good .  Patient demonstrates understanding/independence with home program.  Patient is appropriate to continue with skilled physical therapy intervention, as indicated by initial plan of care.    Goal Status:  Pt. will demonstrate/verbalize independence in self-management of condition in : 12 weeks;Progressing toward  Pt. will be independent with home exercise program in : 12 weeks;Progressing toward  Pt. will report decreased intensity, frequency of : Pain;in 12 weeks;Progressing toward  Pt. will have improved quality of sleep: getting 75-90% of required amount;with less pain;in 12 weeks;Progressing toward  Pt. will improve posture : and demonstrate posture with minimal to no cuing;in 6 weeks;Met  Patient will reach / maintain arm movement: forward;overhead;for home chores;for dressing;with partial ROM;with less pain;in 12 weeks;Progressing toward      Plan for next visits:   Add light isotonics fexion and ER 1#  Continue with initial plan of care.  Progress with home program as tolerated.    Subjective:   Exercises are difficult. Shoulder is sore a lot.  Activity increases soreness.  Dr. Mcneal was pleased with swelling management.  Pain Rating:Up to 8/10 cramping, aching. Occasionally at 0/10         Objective:   AAROM to 100 degrees on the pulleys;     AROM before treatment: 80 degrees  AAROM External  rotation to 15 degrees       Treatment Today      TREATMENT MINUTES COMMENTS   Evaluation     Self-care/ Home management     Manual therapy 15 AAROM with hold/stretch flexion and External rotation; light isometric resistance; rhythmic stabilization   Neuromuscular Re-education     Therapeutic Activity     Therapeutic Exercises 15 Exercises:  Exercise #1: Wand press  Comment #1: review: added increased flexion  Exercise #2: Wand ER  Comment #2: Review  Exercise #3: Isometric adduction  Comment #3: Verbal review  Exercise #4: Reverse Codman's  Comment #4: Verbal review  Exercise #5: Isometric ER/IR: x10, 5 sec holds, added to HEP  Comment #5: Table slides: x10, each flexion and horizontal abd/add, added to HEP  Exercise #6: Pulleys  Comment #6: x5 minutes  Exercise #7: UBE  Comment #7: 3 minutes; Level 1     Gait training     Modality__________________                Total 30    Blank areas are intentional and mean the treatment did not include these items.     Margaret Chamberlain PT, CLT  3/20/2017  8:24 AM

## 2021-06-09 NOTE — PROGRESS NOTES
Date of Service: 03/15/17    Date last Seen:  11/16/2016    PCP: Marcelle Medina MD    Impression:   1. Left upper extremity post mastectomy lymphedema - stable   2. Left shoulder contracture and fibrosis with significant tightness in left upper trapezius after shoulder replacement  3. Osteoarthritis left shoulder.  4. Left breast carcinoma  (2003 mod rad mastectomy, chemo and rad)    Plan:   1. Questions were answered.   present.  2. Has erythromycin-has if any signs of cellulitis infection in left arm will take, not for anything or anyone else.   3. Continue present program with therapy.  Doing very well.     4. Follow up with me in 3 months.    Time spent with patient 25 minutes, with greater than 50% time in consultation, education and coordination of care.   _____________________________________________________________________     Chief Complaint: Left arm swelling     History of Present Illness: Sherita Borden returns to the [facility] for follow up of left arm swelling. She is here with her .  Treatment has included lymphedema bandaging, compression sleeve, night time compression with bandaging , exercises, massage and bandaging, range of motion and elevation. She had a reverse left shoulder replacement on January 17, 2017.  Her swelling has been under control and she has not had any infections.  The pain is improving and she is in therapy for it.   She tries to wear her sleeve during the day and also a night garment. She also is bandaging now.  There has been no new numbness, tingling or weakness. There have been no new masses, rashes, or swellings of any other joints. There has been no new unexplained weight loss, loss of appetite, nausea and/or vomiting, shortness of breath, weight loss and chest pain.     Past Medical History:   Diagnosis Date     Asymptomatic Postmenopausal Status     Created by Conversion      Breast cancer 09/2003     Hx antineoplastic chemotherapy       Hx of radiation therapy      Osteopenia     Created by Conversion      Personal history of breast cancer 7/2/2015     Post-mastectomy lymphedema syndrome 7/2/2015       Past Surgical History:   Procedure Laterality Date     AORTA SURGERY       BREAST BIOPSY       CLAVICLE SURGERY       HERNIA REPAIR       MASTECTOMY Left      TENDON REPAIR Left 6/15/15     TOTAL HIP ARTHROPLASTY Right     2x       Current Outpatient Prescriptions   Medication Sig Dispense Refill     acetaminophen (TYLENOL) 650 MG CR tablet Take 650 mg by mouth 2 (two) times a day.       atorvastatin (LIPITOR) 20 MG tablet        biotin 10,000 mcg cap Take by mouth daily.       CALCIUM CITRATE ORAL Take 1,000 mg by mouth 2 times a day at 6:00 am and 4:00 pm.       cholecalciferol, vitamin D3, 400 unit Tab Take 1,000 Units by mouth daily.       DOCUSATE CALCIUM (STOOL SOFTENER ORAL) Take by mouth once daily.       flecainide (TAMBOCOR) 50 MG tablet        metoprolol tartrate (LOPRESSOR) 25 MG tablet        multivitamin (ONE A DAY) per tablet Take by mouth.       OMEGA-3/DHA/EPA/FISH OIL (FISH OIL-OMEGA-3 FATTY ACIDS) 300-1,000 mg capsule Take 2 g by mouth 2 (two) times a day.       polyethylene glycol (MIRALAX) 17 gram packet Take 17 g by mouth.       XARELTO 20 mg Tab        No current facility-administered medications for this visit.        Allergies   Allergen Reactions     Dicloxacillin Sodium Hives     Penicillins Hives       Social History     Social History     Marital status:      Spouse name: N/A     Number of children: N/A     Years of education: N/A     Occupational History     Not on file.     Social History Main Topics     Smoking status: Never Smoker     Smokeless tobacco: Never Used     Alcohol use No     Drug use: No     Sexual activity: Yes     Partners: Male     Birth control/ protection: Post-menopausal     Other Topics Concern     Not on file     Social History Narrative    .  2 children.  Retired teacher  elementary school.       Family History   Problem Relation Age of Onset     Corneal abrasion Mother      Arthritis Mother      COPD Father      Arthritis Father        Review of Systems:  Review of systems is otherwise negative, except as noted above.  Full 12 point review of systems was completed.    Imaging:  US NECK LIMITED  12/17/2015 2:26 PM  INDICATION: left upper trapezius firmness and pain, history of breast cancer  TECHNIQUE: Routine.  COMPARISON: None.  FINDINGS:  No abnormal soft tissue mass or fluid collection is identified. At point were the patient palpates an abnormality there is a posterior rib.  IMPRESSION:    CONCLUSION:  1. No abnormal soft tissue mass or fluid collection identified. Helpful total abnormality corresponds to a prominent posterior rib. If clinically indicated rib radiographs may be helpful for further evaluation.    XR SHOULDER LEFT 2 OR MORE VWS  12/30/2015 11:16 AM  INDICATION: Left upper trapezius tightness. Shoulder deformity after fracture.  COMPARISON: None.  FINDINGS: There are rib fractures involving the lateral left second through fifth ribs with the fourth and fifth rib fractures slightly displaced. These are age indeterminate. Glenohumeral joint space is slightly diminished. Surgical clips are seen   within the left axilla. Old left clavicle fracture. No dislocation.    Physical Exam:  Vitals:    03/15/17 0900   BP: 126/70   Pulse: 60   Resp: 16   Temp: 98.7  F (37.1  C)    There is no height or weight on file to calculate BMI.    Circumferential measures:    Vasc Edema 10/14/2015 12/16/2015 4/13/2016 11/16/2016 3/15/2017   Right-just above MCP 17.7 18.4 17.5 17.4 16   Right Wrist 13.0 14.7 13.7 13 13   Right Up 10cm 16.2 18.5 16.1 18.5 17.4   Right Up 10cm From Elbow 22.7 22.9 22.9 23.4 22.4   Left-just above MCP 16.8 17.4 17 17.5 16   Left Wrist 14.2 15.8 13.7 14.5 12.5   Left Up 10cm 17.1 21 18.5 21 18.1   Left Up 10cm From Elbow 23.7 24.4 24.5 25 24.9     Swelling is  up slightly on left.    General:  76 y.o. female in no apparent distress.  Alert and oriented x 3.  Cooperative. Affect normal.    Musculoskeletal:  Range of motion in shoulders, elbows and wrists is normal except for decreased motion in the left shoulder to 90 degrees abduction and forward flexion.  There is no active joint synovitis, erythema, swelling or joint laxity.      Neurological:  Sensation is intact to pinprick and light touch throughout the upper extremities bilaterally.  Strength testing is normal in shoulder abduction, elbow flexion, elbow extension, wrist extension, hand intrinsics bilaterally.     Vascular: Radial arterial pulses are strong and equal at the wrists bilaterally.  There is no unusual venous distention.     Integumentary: Skin of the arms is warm,dry and uniform without rubor, calor or pain to palpation. Stemmer's sign in the hand is negative.  Some increased fibrosis with decreased venous pattern in left posterior upper arm.    Lymph node exam: There is no cervical, supraclavicular, infraclavicular, or axillary lymphadenopathy on either side noted.      Janett Mcneal MD, ABWMS, FACCWS, [facility]  Medical Director Wound Care and Lymphedema  [facility]  [phone]

## 2021-06-09 NOTE — PROGRESS NOTES
S: Patient was seen in [place] to be measured for two new left arm sleeves with attached gauntlets 30-40 mmHg. She also would like to order a new night sleeve so an RX will be submitted to Dr. Mcneal for this as well.    O: Goal is to evaluate and measure patient for a compression garment that will help control her lymphedema. Observations show there is swelling present from lymphatic fluid. The expected outcome with compliant use is to reduce swelling and control the patient s condition.     A: I took measurements for a new Medi [place] arm sleeve 30-40 mmHg in caramel like she had been wearing. She still measures into the same size, 1 so I will order two of those for her. She also wants a new night sleeve without all the Velcro. I showed her the Sigvaris Chipsleeve and she liked that garment. I will order this in for her as well (size small, regular) and request the RX from Dr. Mcneal.    P: I will call Sherita when all her items are in to set up a delivery appnt.

## 2021-06-09 NOTE — PROGRESS NOTES
"Sherita Borden is a 80 y.o. female who is being evaluated via a billable video visit.      The patient has been notified of following:     \"This video visit will be conducted via a call between you and your physician/provider. We have found that certain health care needs can be provided without the need for an in-person physical exam.  This service lets us provide the care you need with a video conversation.  If a prescription is necessary we can send it directly to your pharmacy.  If lab work is needed we can place an order for that and you can then stop by our lab to have the test done at a later time.    Video visits are billed at different rates depending on your insurance coverage. Please reach out to your insurance provider with any questions.    If during the course of the call the physician/provider feels a video visit is not appropriate, you will not be charged for this service.\"    Patient has given verbal consent to a Video visit? Yes  How would you like to obtain your AVS? AVS Preference: Mail a copy.  Patient would like the video invitation sent by: [phone]  Will anyone else be joining your video visit? No        Additional provider notes: in chart    Video-Visit Details    Type of service:  Video Visit    Originating Location (pt. Location): Home    Distant Location (provider location):  [facility]      Platform used for Video Visit: [employer]    Patient reports: Pt continues to wear compression sleeve and using night garment daily. No new concern.    Change in status of the wound:  NA    Change of drainage- color, amount, odor:  NA    Change of swelling:  no    Change in Pain status:  no    New wounds developed:  NA    Dressing Supplies Sufficient:  no    Reviewed with patient that I will contact them with scheduling a follow up appointment, supply needs and any further teaching that is identified by the provider during the call. I will also send out an AVS with all " education, a wound measuring tape and their next scheduled visit. I will include instructions to assist with installing the application on their mobile device if appropriate for future video visits.     Ester Patel LPN

## 2021-06-09 NOTE — PROGRESS NOTES
[facility] Discharge Summary  Patient Name: Sherita Borden  Date: 3/6/2017  Visit #: 6/8  PTA visit #: 0  Date of evaluation: 11/21/2016  Referral Diagnosis:1) Swelling: Lymphatic; Left arm, Post mastectomy/lumpectomy/LND and left shoulder pain, contracture with rotator cuff tear Dr. Mcneal  2) Left shoulder advanced arthritis-presurgical eval and exercises/HEP instruction Dr. Christianson  Referring provider:Janett Mcneal MD and Dr. Kaitlin Christianson      Precautions/Restrictions: No diathermy/US (painfree rom)     Visit Diagnosis:   1. Lymphedema of left arm         Goals:  Pt. will demonstrate/verbalize independence in self-management of condition in : 6 weeks;Met  Pt. will be independent with home exercise program in : 6 weeks;Met  Pt. will improve posture : and demonstrate posture with minimal to no cuing;in 6 weeks;Met  Patient will have a decreased volume in : left;UE;by 5%;by 10%;in 6 weeks;to decrease risk of infection;Partially met  Patient will perform, verbalize self-management of: skin care;self-monitoring;exercise;massage;self-compression;compression wear;compression care;infection prevention;in 6 weeks;Met    Patient was seen for 6 visits from 11/21/16 to 2/8/17 with 0 missed appointments.  The patient met goals and has demonstrated understanding of and independence in the home program for self-care, and progression to next steps.  She will initiate contact if questions or concerns arise.  No further therapy for lymphedema is required at this time.  Pt is returning for Reverse Total Shoulder rehab and swelling will also be monitored.    Therapy will be discontinued at this time.  The patient will need a new referral to resume.    Thank you for your referral.  Margaret Edmonds  3/6/2017  9:01 AM

## 2021-06-10 NOTE — PROGRESS NOTES
[facility] Daily Progress/Discharge Note     Patient Name: Sherita Borden  Date: 5/24/2017  Visit #: 10 + 6/10 at 1x/week  PTA visit #: 4  Missed appointments: 0  Referral Diagnosis: Reverse Total Shoulder 1/17/17  Referring provider: Kaitlin Christianson MD  Precautions / Restrictions : Only to 10# on left; 5# repetitively.   Please reassure patient during therapy and work primarily on functional ROM (i.e. Her biomechanics will not be normal as it is a reverse total shoulder replacement; so we do not really want the focus to be on avoiding shoulder hiking or proper mechanics, but rather improving her functional ROM and her comfort with ADLs and activities she would like to do) Also,pt can go behind her back a little for functional activities but we do not want this pushed with therapy/motion work.       Visit Diagnosis:     ICD-10-CM    1. 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 Pain in joint of left shoulder M25.512    3. Generalized muscle weakness M62.81          Assessment:   Pt has reached her goals.  She has about 110-120 degrees of elevation; pain is 0-2/10 and strength is WFL.  Pt is independent in HEP and management of swelling. Pt is ready for discharge.  Patient demonstrates understanding/independence with home program.    Goal Status:  Pt. will demonstrate/verbalize independence in self-management of condition in : 12 weeks;Met  Pt. will be independent with home exercise program in : 12 weeks;Met  Pt. will report decreased intensity, frequency of : Pain;in 12 weeks;Met  Pt. will have improved quality of sleep: getting 75-90% of required amount;with less pain;in 12 weeks;Met  Patient will reach / maintain arm movement: forward;overhead;for home chores;for dressing;with partial ROM;with less pain;in 12 weeks;Met      Plan for next visits:     D/C PT    Subjective:   Has been sick (coughing).  Shoulder has been good.  Saw surgeon and she was happy with results.  Wearing sleeve intermittently.   Always wears night garment.  Pain Ratin- 2/10       Patient Outcome Measures:  Shoulder Pain and Disability Index (SPADI) in %: 10   Scores range from 0-100%, where a score of 0% represents minimal pain and maximal function. The minimal clincically important difference is a score reduction of 10%.    Objective:     110 AROM;  PROM to 120 degrees    Treatment Today      TREATMENT MINUTES COMMENTS   Evaluation     Self-care/ Home management  Discussed post op POC  And next steps.   Manual therapy 15 MLT/STM/MFR, L shoulder   Neuromuscular Re-education     Therapeutic Activity     Therapeutic Exercises 15  Exercises:  Exercise #1: UBE  Comment #1: x5 minutes  Exercise #2: Pulleys  Comment #2: Review  Exercise #3: REverse codmans with 1 and 2#  Comment #3: x30  Exercise #4: AAROM  Comment #4: all planes        Gait training     Modality__________________                Total 30    Blank areas are intentional and mean the treatment did not include these items.     Margaret Chamberlain PT, CLT  2017  8:30 AM

## 2021-06-10 NOTE — PROGRESS NOTES
[facility] Daily Progress     Patient Name: Sherita Borden  Date: 4/12/2017  Visit #:  10 + 1/10 at 1x/week  PTA visit #:  3     Referral Diagnosis: Reverse Total Shoulder 1/17/17  Referring provider: Kaitlin Christianson MD  Precautions / Restrictions : Only to 10# on left; 5# repetitively.    Please reassure patient during therapy and work primarily on functional ROM (i.e. Her biomechanics will not be normal as it is a reverse total shoulder replacement; so we do not really want the focus to be on avoiding shoulder hiking or proper mechanics, but rather improving her functional ROM and her comfort with ADLs and activities she would like to do)  Also,pt can go behind her back a little for functional activities but we do not want this pushed with therapy/motion work.      Visit Diagnosis:     ICD-10-CM    1. 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 Pain in joint of left shoulder M25.512    3. Generalized muscle weakness M62.81          Assessment:   Pt had some increased fibrosity on the left forearm so part of the treatment included MLT.  Pt doing well; noting increased function and decreased pain with driving.  HEP/POC compliance is  good .  Patient is appropriate to continue with skilled physical therapy intervention, as indicated by initial plan of care.    Goal Status:  Pt. will demonstrate/verbalize independence in self-management of condition in : 12 weeks;Progressing toward  Pt. will be independent with home exercise program in : 12 weeks;Progressing toward  Pt. will report decreased intensity, frequency of : Pain;in 12 weeks;Progressing toward  Pt. will have improved quality of sleep: getting 75-90% of required amount;with less pain;in 12 weeks;Progressing toward  Pt. will improve posture : and demonstrate posture with minimal to no cuing;in 6 weeks;Met  Patient will reach / maintain arm movement: forward;overhead;for home chores;for dressing;with partial ROM;with less pain;in 12  weeks;Progressing toward      Plan for next visits:   New POC:    Communication with: Referral Source  Patient Related Instruction: Nature of Condition;Treatment plan and rationale;Self Care instruction;Basis of treatment;Body mechanics;Posture;Precautions;Next steps;Expected outcome  Times per Week: 1  Number of Visits: 10  Discharge Planning: to home program  Therapeutic Exercise: ROM;Stretching;Strengthening  Neuromuscular Reeducation: kinesio tape;posture;TNE  Manual Therapy: soft tissue mobilization;myofascial release;lymphatic drainage massage;joint mobilization  Modalities: hot pack;cold pack  Functional Training (ADL's): self care  Equipment: theraband     Continue with initial plan of care.  Progress with home program as tolerated.   Work on strength for activities    Subjective:   Stayed sore the rest of the day.  Doing exercises daily.  Driving a little easier today.  Pain Ratin       Objective:     Treatment Today      TREATMENT MINUTES COMMENTS   Evaluation     Self-care/ Home management     Manual therapy 20 MLT/STM/MFR and Grade I distraction to joint; AAROM to shoulder  Assist with arm sleeve   Neuromuscular Re-education     Therapeutic Activity     Therapeutic Exercises 10 UBE Level 1 x 4 minutes  Pulleys  Rhythmic stabilization in PNF patterns     Gait training     Modality__________________                Total 30    Blank areas are intentional and mean the treatment did not include these items.     Margaret Chamberlain PT, CLT  2017  9:00 AM

## 2021-06-10 NOTE — PROGRESS NOTES
S: I have received Sherita's Sigvaris Chipsleeve arm sleeve and Medi [place] arm sleeve with attached gauntlets 30-40 mmHg. RX on-file is current from Dr. Mcneal.    A: I assisted Sherita in donning her new night sleeve. Once donned, the garment appeared to be fitting well and she stated it was comfortable. She was instructed on the donning and doffing process, the uses of the garment, and how to care for the item. She went home with the one night sleeve and two Medi [place] arm sleeves with attached gauntlets 30-40 mmHg that she has been wearing previously.    P: She is to call with any further needs.  Goal is to maintain a home program.

## 2021-06-10 NOTE — PROGRESS NOTES
[facility] Daily Progress     Patient Name: Sherita Borden  Date: 5/8/2017  Visit #: 10 + 5/10 at 1x/week  PTA visit #: 4  Referral Diagnosis: Reverse Total Shoulder 1/17/17  Referring provider: Kaitlin Christianson MD  Precautions / Restrictions : Only to 10# on left; 5# repetitively.   Please reassure patient during therapy and work primarily on functional ROM (i.e. Her biomechanics will not be normal as it is a reverse total shoulder replacement; so we do not really want the focus to be on avoiding shoulder hiking or proper mechanics, but rather improving her functional ROM and her comfort with ADLs and activities she would like to do) Also,pt can go behind her back a little for functional activities but we do not want this pushed with therapy/motion work.       Visit Diagnosis:     ICD-10-CM    1. 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 Pain in joint of left shoulder M25.512    3. Generalized muscle weakness M62.81          Assessment:   Nearing goals.  Patient's pain is down and function is better.  She still feels weak with reaching but she was instructed that this is normal and that she will get milena with time.  Patient demonstrates understanding/independence with home program.  Pt is appropriate for 1-2 more visits.  Pt to see MD before last PT visit.    Goal Status:  Pt. will demonstrate/verbalize independence in self-management of condition in : 12 weeks;Progressing toward  Pt. will be independent with home exercise program in : 12 weeks;Met  Pt. will report decreased intensity, frequency of : Pain;in 12 weeks;Met  Pt. will have improved quality of sleep: getting 75-90% of required amount;with less pain;in 12 weeks;Met  Pt. will improve posture : and demonstrate posture with minimal to no cuing;in 6 weeks;Met  Patient will reach / maintain arm movement: forward;overhead;for home chores;for dressing;with partial ROM;with less pain;in 12 weeks;Progressing toward      Plan for next visits:    1-2 more visits?  Continue with initial plan of care.  Progress with home program as tolerated.   Progress to isotonic flexion, ER/IR in side lying 1-2#    Subjective:   Reaching difficult.  It feels weak. Sleeping well. No pain with driving.  Feels stiff today.  Can go behind back a little for getting shirt off.  Dressing is easier; no pain.  Pain Ratin       Objective:     AAROM to 117 in flexion.    Treatment Today      TREATMENT MINUTES COMMENTS   Evaluation     Self-care/ Home management     Manual therapy 15 MLT/STM/MFR, L shoulder   Neuromuscular Re-education     Therapeutic Activity     Therapeutic Exercises 10 Exercises:  Exercise #1: UBE  Comment #1: x3 minutes  Exercise #2: Pulleys  Comment #2: Review  Exercise #3: PNF rhythmic stabilization  Comment #3: x30  Exercise #4: AAROM  Comment #4: all planes        Gait training     Modality__________________                Total 25    Blank areas are intentional and mean the treatment did not include these items.     Margaret Chamberlain PT, CLT  2017  9:33 AM

## 2021-06-10 NOTE — PROGRESS NOTES
[facility] Daily Progress     Patient Name: Sherita Borden  Date: 4/19/2017  Visit #: 10 + 2/10 at 1x/week  PTA visit #: 3  Referral Diagnosis: Reverse Total Shoulder 1/17/17  Referring provider: Kaitlin Christianson MD  Precautions / Restrictions : Only to 10# on left; 5# repetitively.   Please reassure patient during therapy and work primarily on functional ROM (i.e. Her biomechanics will not be normal as it is a reverse total shoulder replacement; so we do not really want the focus to be on avoiding shoulder hiking or proper mechanics, but rather improving her functional ROM and her comfort with ADLs and activities she would like to do) Also,pt can go behind her back a little for functional activities but we do not want this pushed with therapy/motion work.       Visit Diagnosis:     ICD-10-CM    1. 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 Pain in joint of left shoulder M25.512    3. Generalized muscle weakness M62.81          Assessment:   Function continues to improve.  Treatments will be once a week now until MD appointment.  Patient is appropriate to continue with skilled physical therapy intervention, as indicated by initial plan of care.    Goal Status:  Pt. will demonstrate/verbalize independence in self-management of condition in : 12 weeks;Progressing toward  Pt. will be independent with home exercise program in : 12 weeks;Progressing toward  Pt. will report decreased intensity, frequency of : Pain;in 12 weeks;Progressing toward  Pt. will have improved quality of sleep: getting 75-90% of required amount;with less pain;in 12 weeks;Progressing toward  Pt. will improve posture : and demonstrate posture with minimal to no cuing;in 6 weeks;Met  Patient will reach / maintain arm movement: forward;overhead;for home chores;for dressing;with partial ROM;with less pain;in 12 weeks;Progressing toward      Plan for next visits:   Continue Functional exercises and MT  Check goals  Check  ROSAURA      Subjective:   Very busy and doing lots of things with her UE's.  Can open doors with the left. Can sleep on right with less pain on left.  Driving is most difficult  (turning the wheel and pulling door closed) pain up to 3-4/10 (intermittently)  Pain Ratin       Objective:     AAROM to 110 flexion.    Treatment Today      TREATMENT MINUTES COMMENTS   Evaluation     Self-care/ Home management     Manual therapy 15 MLT/STM/MFR and Grade I distraction to joint; AAROM to shoulder   Neuromuscular Re-education     Therapeutic Activity     Therapeutic Exercises 15 UBE Level 1 x 5 minutes  Pulleys  Rhythmic stabilization in PNF patterns   Gait training     Modality__________________                Total 30    Blank areas are intentional and mean the treatment did not include these items.     Margaret Chamberlain PT, CLT  2017  9:35 AM

## 2021-06-10 NOTE — PROGRESS NOTES
"[facility] Daily Progress     Patient Name: Sherita Borden  Date: 4/10/2017  Visit #: 10/12  PTA visit #: 3  Referral Diagnosis: Reverse Total Shoulder 17  Referring provider: Kaitlin Christianson MD  Precautions / Restrictions : Focus on HEP; AROM;for ADL's; Don't push too much passively; No restrictions on Flexion or ER at side to 40 Degrees; No reaching behind back for 12 weeks.       Visit Diagnosis:     ICD-10-CM    1. 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 Pain in joint of left shoulder M25.512    3. Generalized muscle weakness M62.81          Assessment:   Active ER improving but very painful at the last rep.  AAROM increased to 120 today.  Worked on scap stab with reaching today.  Patient demonstrates understanding/independence with home program.  Patient is appropriate to continue with skilled physical therapy intervention, as indicated by initial plan of care.    Goal Status:  Pt. will demonstrate/verbalize independence in self-management of condition in : 12 weeks;Progressing toward  Pt. will be independent with home exercise program in : 12 weeks;Progressing toward  Pt. will report decreased intensity, frequency of : Pain;in 12 weeks;Progressing toward  Pt. will have improved quality of sleep: getting 75-90% of required amount;with less pain;in 12 weeks;Progressing toward  Pt. will improve posture : and demonstrate posture with minimal to no cuing;in 6 weeks;Met  Patient will reach / maintain arm movement: forward;overhead;for home chores;for dressing;with partial ROM;with less pain;in 12 weeks;Progressing toward      Plan for next visits:   Continue MT, exercise  New orders received to continue PT (orders scanned)  Redo POC and check goals      Subjective:   Saw MD.  \"Keep working on ROM for function.\"  Doesn't need ice or heat lately.  Pain Ratin      Objective:     AAROM: 120 flexion    Treatment Today      TREATMENT MINUTES COMMENTS   Evaluation     Self-care/ Home management   "   Manual therapy 15 STM/MFR and Grade I distraction to joint; AAROM to shoulder   Neuromuscular Re-education     Therapeutic Activity     Therapeutic Exercises 15 Exercises:  Exercise #1: Wand press  Comment #1: review: added increased flexion added 1#  Exercise #2: Wand ER  Comment #2: Review  Exercise #3: Isometric adduction  Comment #3: Verbal review  Exercise #4: Reverse Shakila's  Comment #4: Pt brought in weight from home, about a 2# weight, poor technique and increased pain, so advised to hold on weight  Exercise #5: Isometric ER/IR: x10, 5 sec holds, added to HEP  Comment #5: Table slides: x10, each flexion and horizontal abd/add  Exercise #6: Pulleys  Comment #6: x3 minutes Now has 1 at home, reviewed precautions  Exercise #7: UBE  Comment #7: 3 minutes Level 1  Exercise #8:  SL ER 0#     Comment #8: x 8 reps     Gait training     Modality__________________                Total 30    Blank areas are intentional and mean the treatment did not include these items.     Margaret Chamberlain PT, CLT  4/10/2017  1:01 PM

## 2021-06-10 NOTE — PROGRESS NOTES
[facility] Daily Progress     Patient Name: Sherita Borden  Date: 4/26/2017  Visit #: 10 + 3/10 at 1x/week  PTA visit #: 3  Referral Diagnosis: Reverse Total Shoulder 1/17/17  Referring provider: Kaitlin Christianson MD  Precautions / Restrictions : Only to 10# on left; 5# repetitively.   Please reassure patient during therapy and work primarily on functional ROM (i.e. Her biomechanics will not be normal as it is a reverse total shoulder replacement; so we do not really want the focus to be on avoiding shoulder hiking or proper mechanics, but rather improving her functional ROM and her comfort with ADLs and activities she would like to do) Also,pt can go behind her back a little for functional activities but we do not want this pushed with therapy/motion work.       Visit Diagnosis:     ICD-10-CM    1. 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 Pain in joint of left shoulder M25.512    3. Generalized muscle weakness M62.81          Assessment:   Improving in function.  Patient tolerating more activities at home.  Patient demonstrates understanding/independence with home program.  Patient is appropriate to continue with skilled physical therapy intervention, as indicated by initial plan of care.    Goal Status:  Pt. will demonstrate/verbalize independence in self-management of condition in : 12 weeks;Progressing toward  Pt. will be independent with home exercise program in : 12 weeks;Progressing toward  Pt. will report decreased intensity, frequency of : Pain;in 12 weeks;Progressing toward  Pt. will have improved quality of sleep: getting 75-90% of required amount;with less pain;in 12 weeks;Progressing toward  Pt. will improve posture : and demonstrate posture with minimal to no cuing;in 6 weeks;Met  Patient will reach / maintain arm movement: forward;overhead;for home chores;for dressing;with partial ROM;with less pain;in 12 weeks;Progressing toward      Plan for next visits:   Cont MT and  exercise      Subjective:   Doing fairly well. Driving and getting into and out of car seems to be less painful.  Can reach to wash opposite arm. Sleeps on right without pain.  Cannot sleep on left yet.  Pain Ratin       Objective:     AAROM:  115 degrees  Shoulder Pain and Disability Index (SPADI) in %: 45 (Decreased from 75%)    Treatment Today   TREATMENT MINUTES COMMENTS   Evaluation       Self-care/ Home management       Manual therapy 15 MLT/STM/MFR and Grade I distraction to joint; AAROM to shoulder   Neuromuscular Re-education       Therapeutic Activity       Therapeutic Exercises 15 UBE Level 1 x 5 minutes  Pulleys  Rhythmic stabilization in PNF patterns   Gait training       Modality__________________                        Total 30     Blank areas are intentional and mean the treatment did not include these items.     Blank areas are intentional and mean the treatment did not include these items.     Margaret Chamberlain PT, CLT  2017  9:31 AM

## 2021-06-10 NOTE — PROGRESS NOTES
[facility] Daily Progress     Patient Name: Sherita Borden  Date: 5/1/2017  Visit #: 10 + 4/10 at 1x/week  PTA visit #: 4  Referral Diagnosis: Reverse Total Shoulder 1/17/17  Referring provider: Kaitlin Christianson MD  Precautions / Restrictions : Only to 10# on left; 5# repetitively.   Please reassure patient during therapy and work primarily on functional ROM (i.e. Her biomechanics will not be normal as it is a reverse total shoulder replacement; so we do not really want the focus to be on avoiding shoulder hiking or proper mechanics, but rather improving her functional ROM and her comfort with ADLs and activities she would like to do) Also,pt can go behind her back a little for functional activities but we do not want this pushed with therapy/motion work.       Visit Diagnosis:     ICD-10-CM    1. 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 Pain in joint of left shoulder M25.512    3. Generalized muscle weakness M62.81    4. Stiffness of right shoulder joint M25.611    5. Lymphedema of left arm I89.0    6. Stiffness of left shoulder joint M25.612          Assessment:   Improving in function with a  decrease in pain. Reaching, driving and general ADL's continue to be easier  Patient demonstrates understanding/independence with home program.  Patient is appropriate to continue with skilled physical therapy intervention, as indicated by initial plan of care.    Goal Status:  Pt. will demonstrate/verbalize independence in self-management of condition in : 12 weeks;Progressing toward  Pt. will be independent with home exercise program in : 12 weeks;Progressing toward  Pt. will report decreased intensity, frequency of : Pain;in 12 weeks;Progressing toward  Pt. will have improved quality of sleep: getting 75-90% of required amount;with less pain;in 12 weeks;Progressing toward  Pt. will improve posture : and demonstrate posture with minimal to no cuing;in 6 weeks;Met  Patient will reach / maintain arm movement:  forward;overhead;for home chores;for dressing;with partial ROM;with less pain;in 12 weeks;Progressing toward      Plan for next visits:   Cont MT and exercise      Subjective:   Doing well, finds she is doing more with much less pain. Continues to have occasional days with increased achiness  Pain Ratin Feels 65%-70% better, overall       Objective:   From last visit:  AAROM:  115 degrees  Shoulder Pain and Disability Index (SPADI) in %: 45 (Decreased from 75%)    Treatment Today   TREATMENT MINUTES COMMENTS   Evaluation       Self-care/ Home management       Manual therapy 15 MLT/STM/MFR, L shoulder, Rhythmic stabilization in PNF patterns   Neuromuscular Re-education       Therapeutic Activity       Therapeutic Exercises 10 See flowsheet. Pt compliant with recommended home program   Gait training       Modality__________________                        Total 25     Blank areas are intentional and mean the treatment did not include these items.     Blank areas are intentional and mean the treatment did not include these items.     Sherita Genao PTA,CLT  2017  9:31 AM

## 2021-06-11 NOTE — PROGRESS NOTES
Date of Service: 06/12/17    Date last Seen:  03/15/17    PCP: Marcelle Medina MD    Impression:   1. Left upper extremity post mastectomy lymphedema - stable   2. Left shoulder contracture and fibrosis with significant tightness in left upper trapezius after shoulder replacement  3. Osteoarthritis left shoulder.  4. Left breast carcinoma  (2003 mod rad mastectomy, chemo and rad)    Plan:   1. Questions were answered.    2. Has erythromycin-has if any signs of cellulitis infection in left arm will take, not for anything or anyone else.   3. Continue present program.  Encouraged continuing range of motion and exercise with restrictions as outlines on the left shoulder by ortho.   Doing very well.   New sleeve written for.   4. Follow up with me in 1 year or when needed.    Time spent with patient 25 minutes, with greater than 50% time in consultation, education and coordination of care.   _____________________________________________________________________     Chief Complaint: Left arm swelling     History of Present Illness: Sherita Borden returns to the [facility] for follow up of left arm swelling. Treatment has included lymphedema bandaging, compression sleeve, night time compression with bandaging , exercises, massage and bandaging, range of motion and elevation. She had a reverse left shoulder replacement on January 17, 2017.  Her swelling has been under control and she has not had any infections.  She has worked with therapy and everything is better.   Her range of motion is still decreased but she is much more comfortable.   She wears her sleeve most of the days and wears night time compression.  There has been no new numbness, tingling or weakness. There have been no new masses, rashes, or swellings of any other joints. There has been no new unexplained weight loss, loss of appetite, nausea and/or vomiting, shortness of breath, weight loss and chest pain.     Past Medical History:    Diagnosis Date     Asymptomatic Postmenopausal Status     Created by Conversion      Breast cancer 09/2003     Hx antineoplastic chemotherapy      Hx of radiation therapy      Osteopenia     Created by Conversion      Personal history of breast cancer 7/2/2015     Post-mastectomy lymphedema syndrome 7/2/2015       Past Surgical History:   Procedure Laterality Date     AORTA SURGERY       BREAST BIOPSY       CLAVICLE SURGERY       HERNIA REPAIR       MASTECTOMY Left      TENDON REPAIR Left 6/15/15     TOTAL HIP ARTHROPLASTY Right     2x       Current Outpatient Prescriptions   Medication Sig Dispense Refill     acetaminophen (TYLENOL) 650 MG CR tablet Take 650 mg by mouth 2 (two) times a day.       atorvastatin (LIPITOR) 20 MG tablet 10 mg daily.        biotin 10,000 mcg cap Take by mouth daily.       CALCIUM CITRATE ORAL Take 1,000 mg by mouth 2 times a day at 6:00 am and 4:00 pm.       cholecalciferol, vitamin D3, 400 unit Tab Take 1,000 Units by mouth daily.       DOCUSATE CALCIUM (STOOL SOFTENER ORAL) Take by mouth once daily.       flecainide (TAMBOCOR) 50 MG tablet        metoprolol tartrate (LOPRESSOR) 25 MG tablet        multivitamin (ONE A DAY) per tablet Take by mouth.       OMEGA-3/DHA/EPA/FISH OIL (FISH OIL-OMEGA-3 FATTY ACIDS) 300-1,000 mg capsule Take 2 g by mouth 2 (two) times a day.       polyethylene glycol (MIRALAX) 17 gram packet Take 17 g by mouth.       XARELTO 20 mg Tab        No current facility-administered medications for this visit.        Allergies   Allergen Reactions     Dicloxacillin Sodium Hives     Penicillins Hives       Social History     Social History     Marital status:      Spouse name: N/A     Number of children: N/A     Years of education: N/A     Occupational History     Not on file.     Social History Main Topics     Smoking status: Never Smoker     Smokeless tobacco: Never Used     Alcohol use No     Drug use: No     Sexual activity: Yes     Partners: Male     Birth  control/ protection: Post-menopausal     Other Topics Concern     Not on file     Social History Narrative    .  2 children.  Retired .       Family History   Problem Relation Age of Onset     Corneal abrasion Mother      Arthritis Mother      COPD Father      Arthritis Father        Review of Systems:  Review of systems is otherwise negative, except as noted above.  Full 12 point review of systems was completed.    Imaging:  US NECK LIMITED  12/17/2015 2:26 PM  INDICATION: left upper trapezius firmness and pain, history of breast cancer  TECHNIQUE: Routine.  COMPARISON: None.  FINDINGS:  No abnormal soft tissue mass or fluid collection is identified. At point were the patient palpates an abnormality there is a posterior rib.  IMPRESSION:    CONCLUSION:  1. No abnormal soft tissue mass or fluid collection identified. Helpful total abnormality corresponds to a prominent posterior rib. If clinically indicated rib radiographs may be helpful for further evaluation.    XR SHOULDER LEFT 2 OR MORE VWS  12/30/2015 11:16 AM  INDICATION: Left upper trapezius tightness. Shoulder deformity after fracture.  COMPARISON: None.  FINDINGS: There are rib fractures involving the lateral left second through fifth ribs with the fourth and fifth rib fractures slightly displaced. These are age indeterminate. Glenohumeral joint space is slightly diminished. Surgical clips are seen   within the left axilla. Old left clavicle fracture. No dislocation.    Physical Exam:  Vitals:    06/12/17 0945   BP: 110/60   Pulse: 64   Resp: 16   Temp: 97.8  F (36.6  C)     BMI 19.46    Circumferential measures:    Vasc Edema 12/16/2015 4/13/2016 11/16/2016 3/15/2017 6/12/2017   Right-just above MCP 18.4 17.5 17.4 16 17.6   Right Wrist 14.7 13.7 13 13 14   Right Up 10cm 18.5 16.1 18.5 17.4 16.2   Right Up 10cm From Elbow 22.9 22.9 23.4 22.4 23.2   Left-just above MCP 17.4 17 17.5 16 17.4   Left Wrist 15.8 13.7 14.5 12.5 14.5    Left Up 10cm 21 18.5 21 18.1 18.3   Left Up 10cm From Elbow 24.4 24.5 25 24.9 25.9     Swelling is up slightly on left.    General:  77 y.o. female in no apparent distress.  Alert and oriented x 3.  Cooperative. Affect normal.    Musculoskeletal:  Range of motion in shoulders, elbows and wrists is normal except for decreased motion in the left shoulder to 90 degrees abduction and 105 degrees forward flexion.  There is no active joint synovitis, erythema, swelling or joint laxity.      Neurological:  Sensation is intact to pinprick and light touch throughout the upper extremities bilaterally.  Strength testing is normal in shoulder abduction, elbow flexion, elbow extension, wrist extension, hand intrinsics bilaterally.     Vascular: Radial arterial pulses are strong and equal at the wrists bilaterally.  There is no unusual venous distention.     Integumentary: Skin of the arms is warm,dry and uniform without rubor, calor or pain to palpation. Stemmer's sign in the hand is negative.  Some continuing fibrosis with decreased venous pattern in left posterior upper arm.    Lymph node exam: There is no cervical, supraclavicular, infraclavicular, or axillary lymphadenopathy on either side noted.      Janett Mcneal MD, ABWMS, FACCWS, [facility]  Medical Director Wound Care and Lymphedema  [facility]  [phone]

## 2021-06-14 NOTE — PATIENT INSTRUCTIONS - HE
For Compression Garments:  [facility] (Please call to make an appointment)    [facility]  [address], [address]  [place], [place].  Phone: [phone]    Continue Duricef daily.  Continue present program with Flexitouch, exercise and compression. Has night sleeve.  Needs new sleeve for day.  Written for.  Follow up with me in 8 months, or when needed.  Watch for recurrent infection.

## 2021-06-15 NOTE — PROGRESS NOTES
S: Patient was seen in [place] to be measured for mastectomy bras and to reorder compression garments for her left arm. RX may need modification from Dr. Mcneal if insurance will cover for the night compression garment.    O: Goal is to evaluate and measure patient for a mastectomy garment that will provide her comfort and support. In addition, we will be reordering her previous compression garments for her left arm.    A: Sherita brought the most recent compression garments she would like to have me reorder for her: L&R Tribute Wrap night arm garment (size small, regular), as well as the ABC Medi Universal arm sleeve with attached gauntlet 30-40 mmHg (size 1) in caramel. Her size has not changed since her last measurements were obtained. Sherita showed the post-mastectomy bra she is wearing: Amoena Marcela in black. She is interested in trying new bras though and liked the  Strapless bra in beige, as well as the  Petite T-shirt bra in beige. Both will be ordered around her size (32AA) to try.    P: Sherita is scheduled two weeks out in [place] for delivery. She will be called when insurance is called to determine coverage for items.

## 2021-06-16 NOTE — PROGRESS NOTES
S: Sherita came in on 3/25/2021 to try on her [facility] post-mastectomy bras. RX on-file is current from Dr. Mcneal.    A: Sherita was assisted in trying on her post-mastectomy bras. She found two bras that she really liked. They fit well and she stated they were comfortable. She went home with the  34AA in beige and the  34AA in beige. She was instructed on the uses of the garments and how to care for the items.    P: Sherita is to call with any further needs. She reordered a second 112 bra in black. Order submitted to [facility] and Sherita will receive a call once the item is in to ship to her home address (which was verified).  Goal is to maintain a home program.

## 2021-06-16 NOTE — PROGRESS NOTES
S: Sherita's  Strapless bra is in [facility]. RX on-file is current from Dr. Mcneal    A: No assessment was made. The bra will be shipped out to her home address, which was verified.    P: Sherita is to call with any further needs.  Goal is to maintain a home program.

## 2021-06-16 NOTE — PROGRESS NOTES
S: Sherita's  Strapless bra is in [facility]. RX on-file is current from Dr. Mcneal.    A: No assessment was made. The bra will be shipped out to her home address, which was verified.    P: Sherita is to call with any further needs.  Goal is to maintain a home program.

## 2021-06-18 NOTE — PROGRESS NOTES
[address]  [place] [address]    Toll-Free Phone [phone] Extension 5821/message left regarding trial wanted  Toll free fax [phone]/ note/order faxed

## 2021-06-18 NOTE — PROGRESS NOTES
6/15/2018    This is to document that a basic pump trial was done to work with Sherita Vazquez's left arm postmastectomy lymphatic swelling.  The basic pump trial was a failure due to proximal swelling.  This has been fully documented and results of the testing have been scanned into Epic.

## 2021-06-18 NOTE — PROGRESS NOTES
Date of Service: 06/11/18    Date last Seen:  06/12/17    PCP: Sheldon Chowdhury MD    Impression:   1. Left upper extremity post mastectomy lymphedema - stable   2. Left shoulder contracture and fibrosis with significant tightness in left upper trapezius after shoulder replacement  3. Osteoarthritis left shoulder.  4.  Recurrent cellulitis left arm history  5. Left breast carcinoma  (2003 mod rad mastectomy, chemo and rad)    Plan:   1. Questions were answered.    2. Needs erythromycin refill.  Done.  3.  With problems with recurrent infections which come on suddenly and can be very severe, with the post mastectomy swelling,  Will do trial of Flexitouch.   4. Continue present program.  New sleeves written for and exercises reviewed.    5.  Follow up with me in 3-4 months or when needed.    Time spent with patient 25 minutes, with greater than 50% time in consultation, education and coordination of care.   _____________________________________________________________________     Chief Complaint: Left arm swelling     History of Present Illness: Sherita Borden returns to the [facility] for follow up of left arm swelling due to postmastectomy lymphedema complicated by left shoulder replacement.  She was diagnosed with breast cancer on the left in 2003 and had modified radical mastectomy followed by chemotherapy and radiation.  She then had a reverse left shoulder replacement on January 17, 2017.  Treatment for her postmastectomy swelling has included lymphedema bandaging, compression sleeve, night time compression with bandaging, exercises, massage and bandaging, range of motion and elevation.  Her swelling has been under control.  She was hospitalized in January 2018 for left arm cellulitis.  She did not feel it coming on.  Her infection before that was 2 years earlier.   She wears her sleeve most of the days and wears night time compression.  There has been no new  numbness, tingling or weakness. There have been no new masses, rashes, or swellings of any other joints. There has been no new unexplained weight loss, loss of appetite, nausea and/or vomiting, shortness of breath, weight loss and chest pain.     Past Medical History:   Diagnosis Date     Asymptomatic Postmenopausal Status     Created by Conversion      Atrial fibrillation      Breast cancer 09/2003     Hx antineoplastic chemotherapy      Hx of radiation therapy      Hypercholesteremia      Osteopenia     Created by Conversion      Personal history of breast cancer 7/2/2015     Post-mastectomy lymphedema syndrome 7/2/2015       Past Surgical History:   Procedure Laterality Date     AORTA SURGERY       BREAST BIOPSY       CLAVICLE SURGERY       HERNIA REPAIR       left shoulder replacement       MASTECTOMY Left      TENDON REPAIR Left 6/15/15     TOTAL HIP ARTHROPLASTY Right     2x       Current Outpatient Prescriptions   Medication Sig Dispense Refill     acetaminophen (TYLENOL) 500 MG tablet Take 1,000 mg by mouth.       acetaminophen (TYLENOL) 650 MG CR tablet Take 650 mg by mouth 2 (two) times a day.       atorvastatin (LIPITOR) 10 MG tablet Take 10 mg by mouth daily.  3     atorvastatin (LIPITOR) 20 MG tablet 10 mg daily.        biotin 10,000 mcg cap Take by mouth daily.       biotin-keratin 10,000-100 mcg-mg Tab Take by mouth.       CALCIUM CITRATE ORAL Take 1,000 mg by mouth 2 times a day at 6:00 am and 4:00 pm.       calcium citrate-vitamin D3 250 mg calcium- 200 unit Tab Take by mouth.       cholecalciferol, vitamin D3, 400 unit Tab Take 1,000 Units by mouth daily.       DOCUSATE CALCIUM (STOOL SOFTENER ORAL) Take by mouth once daily.       docusate sodium (COLACE) 100 MG capsule Take 100 mg by mouth.       flecainide (TAMBOCOR) 50 MG tablet        flecainide (TAMBOCOR) 50 MG tablet Take 50 mg by mouth.       metoprolol tartrate (LOPRESSOR) 25 MG tablet        metoprolol tartrate (LOPRESSOR) 25 MG tablet  Take 25 mg by mouth.       multivitamin (ONE A DAY) per tablet Take by mouth.       multivitamin-minerals-lutein (CENTRUM SILVER) tablet Take 1 tablet by mouth.       OMEGA-3/DHA/EPA/FISH OIL (FISH OIL-OMEGA-3 FATTY ACIDS) 300-1,000 mg capsule Take 2 g by mouth 2 (two) times a day.       polyethylene glycol (MIRALAX) 17 gram packet Take 17 g by mouth.       polyethylene glycol (MIRALAX) 17 gram packet Take 17 g by mouth.       rivaroxaban (XARELTO) 20 mg Tab TAKE 1 TABLET BY MOUTH DAILY.       XARELTO 20 mg Tab        No current facility-administered medications for this visit.        Allergies   Allergen Reactions     Dicloxacillin Sodium Hives     Penicillins Hives     Atorvastatin Unknown       Social History     Social History     Marital status:      Spouse name: N/A     Number of children: N/A     Years of education: N/A     Occupational History     Not on file.     Social History Main Topics     Smoking status: Never Smoker     Smokeless tobacco: Never Used     Alcohol use No     Drug use: No     Sexual activity: Yes     Partners: Male     Birth control/ protection: Post-menopausal     Other Topics Concern     Not on file     Social History Narrative    .  2 children.  Retired .       Family History   Problem Relation Age of Onset     Corneal abrasion Mother      Arthritis Mother      COPD Father      Arthritis Father        Review of Systems:  Review of systems is otherwise negative, except as noted above.  Full 12 point review of systems was completed.    Imaging:    I personally reviewed the following imaging today and those on care everywhere, if indicated    XR CHEST PA AND LATERAL  11/27/2017 10:15 AM     INDICATION: Malignant neoplasm of unspecified site of unspecified female breast  COMPARISON: CT chest 11/11/2010.     FINDINGS: Axillary dissection clips on the left. Post left glenohumeral reverse arthroplasty. Chronic scarring at the lung apices. No lung  consolidation. No pulmonary nodules or masses detected. Normal heart size and pulmonary vascularity. Mild calcified   plaque the aorta. Pectus excavatum.     [facility]  CT CHEST, ABDOMEN, AND PELVIS  5/8/2018 3:34 PM         INDICATION: Pain to left side, just under rib, intermittent but severe, and a bulge midline right side  TECHNIQUE: CT chest, abdomen, and pelvis. Dose reduction techniques were used.   IV CONTRAST: None  COMPARISON: CT chest 04/16/2018 and CT abdomen and pelvis 05/27/2015    FINDINGS:  CHEST:     Lungs and pleura: Stable mild biapical pleural thickening and calcification. Small clustered groundglass nodules in the left lower lobe are new, series 3 image 83, and are consistent with acute pneumonitis. Stable mild pleural-parenchymal scarring in the anterior, lateral, and lingular segments left upper lobe and lateral segment left lower lobe.    Mediastinum: No lymphadenopathy. Heart size is normal. Stable mild mediastinal shift to the left secondary to left lung volume loss.    ABDOMEN: Stable small left hepatic cyst. Small gallstones. No gross biliary dilatation. Pancreas, spleen, both adrenal glands, both kidneys, and stomach are normal.    PELVIS: Stable aortobiiliac endograft. Bladder is nearly empty. Colonic diverticulosis without diverticulitis. Normal appendix. No free fluid or lymphadenopathy.    MUSCULOSKELETAL: Left mastectomy. Right TERRENCE. Moderate to severe degenerative arthritis in the left hip. Left TSA. Old healed left clavicle fracture. 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 There are additional old healed left rib fractures which show bony union but are mildly malaligned. Old healed fracture of the sternum. Mild compression fractures of T10-L1 are unchanged. Generalized demineralization. Degenerative changes in the lumbar spine.    CONCLUSION:  1.  Mild acute pneumonitis in the left lower lobe is new since  04/16/2018.    2.  Multiple old healed fractures of the sternum, left clavicle, left ribs, and vertebral bodies. 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    3.  Colonic diverticulosis without diverticulitis.    4.  Gallstones.    XR SHOULDER LT AP/Y/AXILLARY  1/17/2018 4:35 PM    INDICATION: Left shoulder cellulitis      COMPARISON: 05/22/2017    FINDINGS: Reverse shoulder arthroplasty, components intact and well seated.   No fracture or dislocation. No radiograph stigmata of osteomyelitis. No   evidence of soft tissue gas.    Labs:    I personally reviewed the following labs today and those on care everywhere, if indicated    2/12/18  BUN 23  Cr 0.78      Physical Exam:  Vitals:    06/11/18 0923   BP: 110/58   Pulse: 62   Resp: 16   Temp: 98  F (36.7  C)     BMI 21.07 (increased)    Circumferential measures:    Vasc Edema 4/13/2016 11/16/2016 3/15/2017 6/12/2017 6/11/2018   Right-just above MCP 17.5 17.4 16 17.6 18   Right Wrist 13.7 13 13 14 14   Right Up 10cm 16.1 18.5 17.4 16.2 18.5   Right Up 10cm From Elbow 22.9 23.4 22.4 23.2 24.1   Left-just above MCP 17 17.5 16 17.4 17.2   Left Wrist 13.7 14.5 12.5 14.5 14.5   Left Up 10cm 18.5 21 18.1 18.3 20.2   Left Up 10cm From Elbow 24.5 25 24.9 25.9 26.2     Swelling is stable.    General:  78 y.o. female in no apparent distress.      Psych:  Alert and oriented x 3.  Cooperative. Affect normal.    Musculoskeletal:  Range of motion in shoulders, elbows and wrists is normal except for decreased motion in the left shoulder to 90 degrees abduction and 105 degrees forward flexion.  This is stable.   There is no active joint synovitis, erythema, swelling or joint laxity.      Neurological:  Sensation is intact to pinprick and light touch throughout the upper extremities bilaterally.  Strength testing is normal in shoulder abduction, elbow flexion, elbow extension, wrist extension, hand  intrinsics bilaterally.     Vascular: Radial arterial pulses are strong and equal at the wrists bilaterally.  There is no unusual venous distention.     Integumentary: Skin of the arms is warm,dry and uniform without rubor, calor or pain to palpation. Stemmer's sign in the hand is negative.  Some continuing fibrosis with decreased venous pattern in left posterior upper arm.  Unchanged.     Lymph node exam: There is no cervical, supraclavicular, infraclavicular, or axillary lymphadenopathy on either side noted.      Janett Mcneal MD, ABWMS, FACCWS, [facility]  Medical Director Wound Care and Lymphedema  [facility]  [phone]

## 2021-06-20 NOTE — PROGRESS NOTES
Pt was hospitalized in julyy for cellulitis on left arm. Pt continues to use flexitouch and states that it helps. No pain.

## 2021-06-20 NOTE — PROGRESS NOTES
Date of Service: 09/27/18    Date last Seen:  06/11/18    PCP: Sheldon Chowdhury MD    Impression:   1. Left upper extremity post mastectomy lymphedema - stable   2. Left shoulder contracture and fibrosis with significant tightness in left upper trapezius after shoulder replacement  3. Osteoarthritis left shoulder.  4. Cellulitis left arm history (1/18, 6/13/18)  5. Left breast carcinoma  (2003 mod rad mastectomy, chemo and rad)    Plan:   1. Questions were answered.    2.  Has doxycycline if needed.  She knows how to use it appropriately.  3.  With problems with recurrent infections which come on suddenly and can be very severe, with the post mastectomy swelling.  Now using Flexitouch. Had not started before most recent infection.  Needs to do regularly.  This was discussed.   4. Continue present program.  New sleeves written for.  Also new bras.   5.  Follow up with me in 1 year or when needed. If she continues to get problems with infections she is to come in sooner.    Time spent with patient 25 minutes, with greater than 50% time in consultation, education and coordination of care.   _____________________________________________________________________     Chief Complaint: Left arm swelling     History of Present Illness: Sherita Borden returns to the [facility] for follow up of left arm swelling due to postmastectomy lymphedema complicated by left shoulder replacement.  She was diagnosed with breast cancer on the left in 2003 and had modified radical mastectomy followed by chemotherapy and radiation.  She then had a reverse left shoulder replacement on January 17, 2017.  Treatment for her postmastectomy swelling has included lymphedema bandaging, compression sleeve, night time compression with bandaging, exercises, massage and bandaging, range of motion and elevation.  Her swelling has been under control.  She has had problems with recurrent infection.  She had  an infection in January 2018 and then July 2018 2 days after she was seen here in clinic.  The infection came on very suddenly and, unfortunately, she was unable to start the antibiotics immediately. She wears her sleeve most of the days and wears night time compression.  There has been no new numbness, tingling or weakness. There have been no new masses, rashes, or swellings of any other joints. There has been no new unexplained weight loss, loss of appetite, nausea and/or vomiting, shortness of breath, weight loss and chest pain.  She needs new bras and also new compression sleeves.  She has doxycycline to use in the event of infection.    Past Medical History:   Diagnosis Date     Asymptomatic Postmenopausal Status     Created by Conversion      Atrial fibrillation (H)      Breast cancer (H) 09/2003     Hx antineoplastic chemotherapy      Hx of radiation therapy      Hypercholesteremia      Osteopenia     Created by Conversion      Personal history of breast cancer 7/2/2015     Post-mastectomy lymphedema syndrome 7/2/2015       Past Surgical History:   Procedure Laterality Date     AORTA SURGERY       BREAST BIOPSY       CLAVICLE SURGERY       HERNIA REPAIR       left shoulder replacement       MASTECTOMY Left      TENDON REPAIR Left 6/15/15     TOTAL HIP ARTHROPLASTY Right     2x       Current Outpatient Prescriptions   Medication Sig Dispense Refill     acetaminophen (TYLENOL) 500 MG tablet Take 1,000 mg by mouth.       acetaminophen (TYLENOL) 650 MG CR tablet Take 650 mg by mouth 2 (two) times a day.       atorvastatin (LIPITOR) 10 MG tablet Take 10 mg by mouth daily.  3     biotin-keratin 10,000-100 mcg-mg Tab Take by mouth.       CALCIUM CITRATE ORAL Take 1,000 mg by mouth 2 times a day at 6:00 am and 4:00 pm.       calcium citrate-vitamin D3 250 mg calcium- 200 unit Tab Take by mouth.       cholecalciferol, vitamin D3, 400 unit Tab Take 1,000 Units by mouth daily.       DOCUSATE CALCIUM (STOOL SOFTENER  ORAL) Take by mouth once daily.       docusate sodium (COLACE) 100 MG capsule Take 100 mg by mouth.       flecainide (TAMBOCOR) 50 MG tablet Take 50 mg by mouth.       multivitamin (ONE A DAY) per tablet Take by mouth.       multivitamin-minerals-lutein (CENTRUM SILVER) tablet Take 1 tablet by mouth.       OMEGA-3/DHA/EPA/FISH OIL (FISH OIL-OMEGA-3 FATTY ACIDS) 300-1,000 mg capsule Take 2 g by mouth 2 (two) times a day.       polyethylene glycol (MIRALAX) 17 gram packet Take 17 g by mouth.       rivaroxaban (XARELTO) 20 mg Tab TAKE 1 TABLET BY MOUTH DAILY.       calcium carbonate-vitamin D3 600 mg(1,500mg) -200 unit per tablet Take by mouth.       No current facility-administered medications for this visit.        Allergies   Allergen Reactions     Dicloxacillin Sodium Hives     Penicillins Hives     Atorvastatin Unknown       Social History     Social History     Marital status:      Spouse name: N/A     Number of children: N/A     Years of education: N/A     Occupational History     Not on file.     Social History Main Topics     Smoking status: Never Smoker     Smokeless tobacco: Never Used     Alcohol use No     Drug use: No     Sexual activity: Yes     Partners: Male     Birth control/ protection: Post-menopausal     Other Topics Concern     Not on file     Social History Narrative    .  2 children.  Retired .       Family History   Problem Relation Age of Onset     Corneal abrasion Mother      Arthritis Mother      COPD Father      Arthritis Father        Review of Systems:  Review of systems is otherwise negative, except as noted above.  Full 12 point review of systems was completed.    Imaging:    I personally reviewed the following imaging today and those on care everywhere, if indicated    XR CHEST PA AND LATERAL  11/27/2017 10:15 AM     INDICATION: Malignant neoplasm of unspecified site of unspecified female breast  COMPARISON: CT chest 11/11/2010.     FINDINGS:  Axillary dissection clips on the left. Post left glenohumeral reverse arthroplasty. Chronic scarring at the lung apices. No lung consolidation. No pulmonary nodules or masses detected. Normal heart size and pulmonary vascularity. Mild calcified   plaque the aorta. Pectus excavatum.     [facility]  CT CHEST, ABDOMEN, AND PELVIS  5/8/2018 3:34 PM         INDICATION: Pain to left side, just under rib, intermittent but severe, and a bulge midline right side  TECHNIQUE: CT chest, abdomen, and pelvis. Dose reduction techniques were used.   IV CONTRAST: None  COMPARISON: CT chest 04/16/2018 and CT abdomen and pelvis 05/27/2015    FINDINGS:  CHEST:     Lungs and pleura: Stable mild biapical pleural thickening and calcification. Small clustered groundglass nodules in the left lower lobe are new, series 3 image 83, and are consistent with acute pneumonitis. Stable mild pleural-parenchymal scarring in the anterior, lateral, and lingular segments left upper lobe and lateral segment left lower lobe.    Mediastinum: No lymphadenopathy. Heart size is normal. Stable mild mediastinal shift to the left secondary to left lung volume loss.    ABDOMEN: Stable small left hepatic cyst. Small gallstones. No gross biliary dilatation. Pancreas, spleen, both adrenal glands, both kidneys, and stomach are normal.    PELVIS: Stable aortobiiliac endograft. Bladder is nearly empty. Colonic diverticulosis without diverticulitis. Normal appendix. No free fluid or lymphadenopathy.    MUSCULOSKELETAL: Left mastectomy. Right TERRENCE. Moderate to severe degenerative arthritis in the left hip. Left TSA. Old healed left clavicle fracture. 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 There are additional old healed left rib fractures which show bony union but are mildly malaligned. Old healed fracture of the sternum. Mild compression fractures of T10-L1 are unchanged. Generalized demineralization. Degenerative  changes in the lumbar spine.    CONCLUSION:  1.  Mild acute pneumonitis in the left lower lobe is new since 04/16/2018.    2.  Multiple old healed fractures of the sternum, left clavicle, left ribs, and vertebral bodies. 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    3.  Colonic diverticulosis without diverticulitis.    4.  Gallstones.    XR SHOULDER LT AP/Y/AXILLARY  1/17/2018 4:35 PM    INDICATION: Left shoulder cellulitis      COMPARISON: 05/22/2017    FINDINGS: Reverse shoulder arthroplasty, components intact and well seated.   No fracture or dislocation. No radiograph stigmata of osteomyelitis. No   evidence of soft tissue gas.    Labs:    I personally reviewed the following labs today and those on care everywhere, if indicated    2/12/18  BUN 23  Cr 0.78      Physical Exam:  Vitals:    09/27/18 0931   BP: 112/60   Pulse: 60   Resp: 20   Temp: 98.1  F (36.7  C)     BMI 21.16 (stable)    Circumferential measures:    Vasc Edema 11/16/2016 3/15/2017 6/12/2017 6/11/2018 9/27/2018   Right-just above MCP 17.4 16 17.6 18 18   Right Wrist 13 13 14 14 13.5   Right Up 10cm 18.5 17.4 16.2 18.5 18.5   Right Up 10cm From Elbow 23.4 22.4 23.2 24.1 25   Left-just above MCP 17.5 16 17.4 17.2 17.6   Left Wrist 14.5 12.5 14.5 14.5 14   Left Up 10cm 21 18.1 18.3 20.2 20.5   Left Up 10cm From Elbow 25 24.9 25.9 26.2 27     Swelling is stable.    General:  78 y.o. female in no apparent distress.      Psych:  Alert and oriented x 3.  Cooperative. Affect normal.    Musculoskeletal:  Range of motion in shoulders, elbows and wrists is normal except for continued decreased motion in the left shoulder to 90 degrees abduction and 105 degrees forward flexion.  This is stable.   There is no active joint synovitis, erythema, swelling or joint laxity.      Neurological:  Sensation is intact to pinprick and light touch throughout the upper extremities bilaterally.   Strength testing is normal in shoulder abduction, elbow flexion, elbow extension, wrist extension, hand intrinsics bilaterally. Biceps, triceps and brachioradialis reflexes normal bilaterally.     Vascular: Radial arterial pulses are strong and equal at the wrists bilaterally.  There is no unusual venous distention.     Integumentary: Skin of the arms is warm,dry and uniform without rubor, calor or pain to palpation. Stemmer's sign in the hand is negative.  Some continuing fibrosis with decreased venous pattern in left posterior upper arm with decreased venous pattern seen.  Unchanged.     Lymph node exam: There is no cervical, supraclavicular, infraclavicular, or axillary lymphadenopathy on either side noted.    Janett Mcneal MD, ABWMS, FACCWS, [facility]  Medical Director Wound Care and Lymphedema  [facility]  [phone]

## 2021-06-25 NOTE — PROGRESS NOTES
Progress Notes by Sherita Genao PTA at 4/3/2017  9:30 AM     Author: Sherita Genao PTA Service: -- Author Type: Physical Therapist Assistant    Filed: 4/3/2017 10:23 AM Encounter Date: 4/3/2017 Status: Signed    : Sherita Genao PTA (Physical Therapist Assistant)       [facility] Daily Progress     Patient Name: Sherita Borden  Date: 4/3/2017  Visit #:8/12  JAVY visit #: 3  Referral Diagnosis: Reverse Total Shoulder 1/17/17  Referring provider: Kaitlin Christianson MD  Precautions / Restrictions : Focus on HEP; AROM;for ADL's; Don't push too much passively; No restrictions on Flexion or ER at side to 40 Degrees; No reaching behind back for 12 weeks.       Visit Diagnosis:     ICD-10-CM    1. 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 Pain in joint of left shoulder M25.512    3. Generalized muscle weakness M62.81    4. Stiffness of right shoulder joint M25.611    5. Lymphedema of left arm I89.0    6. Stiffness of left shoulder joint M25.612          Assessment:     ROM slowly improving  Pt tolerated SL L shoulder ER today with less pain, not yet ready for weight  Patient is appropriate to continue with skilled physical therapy intervention, as indicated by initial plan of care.    Goal Status:  Pt. will demonstrate/verbalize independence in self-management of condition in : 12 weeks;Progressing toward  Pt. will be independent with home exercise program in : 12 weeks;Progressing toward  Pt. will report decreased intensity, frequency of : Pain;in 12 weeks;Progressing toward  Pt. will have improved quality of sleep: getting 75-90% of required amount;with less pain;in 12 weeks;Progressing toward  Pt. will improve posture : and demonstrate posture with minimal to no cuing;in 6 weeks;Met  Patient will reach / maintain arm movement: forward;overhead;for home chores;for dressing;with partial ROM;with less pain;in 12 weeks;Progressing toward      Plan for next visits:     Continue with initial plan of  care.  Progress with home program as tolerated.  Pt now has pulleys at home,so consider warmup with UBE  Pt to continue with weights for pressups, as tolerated, but hold for now when performing Reverse codmans as, that increases pain  Subjective:     Pain Ratin-3 , less achiness and cramping  Having more pain with reverse codmans using her home weight  Objective:   AAROM:  ER:14 degrees; Flexion 110 degrees      Treatment Today      TREATMENT MINUTES COMMENTS   Evaluation     Self-care/ Home management     Manual therapy 15 AAROM L shoulder adhering to precautions,STM/MFR L upper trap,periscapular region and lateral trunk   Neuromuscular Re-education     Therapeutic Activity     Therapeutic Exercises Reviewed HEP and weight pt was using at home, about 2#, pt to hold for now when performing reverse codmans,as  it increased pain  20 Exercises:  Exercise #1: Wand press  Comment #1: review: added increased flexion added 1#  Exercise #2: Wand ER  Comment #2: Review  Exercise #3: Isometric adduction  Comment #3: Verbal review  Exercise #4: Reverse Codman's  Comment #4: Pt brought in weight from home, about a 2# weight, poor technique and increased pain, so advised to hold on weight  Exercise #5: Isometric ER/IR: x10, 5 sec holds, added to HEP  Comment #5: Table slides: x10, each flexion and horizontal abd/add  Exercise #6: Pulleys  Comment #6: x3 minutes Now has 1 at home, reviewed precautions  Exercise #7: Nustep  Comment #7: 4 minutes Level 3, not today  Exercise #8:  SL ER 0#  or  supine hooklying with pillow under upper arm, reviewed ER limitations, whatever is more comfortable   Comment #8: x10 reps     Gait training     Modality__________________                Total 35    Blank areas are intentional and mean the treatment did not include these items.     4/3/2017  9:28 AM  .Paty Genao PTA,CLT

## 2021-06-25 NOTE — PROGRESS NOTES
Progress Notes by Margaret Edmonds PT at 3/8/2017 11:30 AM     Author: Margaret Edmonds PT Service: -- Author Type: Physical Therapist    Filed: 3/8/2017 12:36 PM Encounter Date: 3/8/2017 Status: Signed    : Margaret Edmonds PT (Physical Therapist)       [facility] Daily Progress     Patient Name: Sherita Borden  Date: 3/8/2017  Visit #: 2/12  PTA visit #:  0  Referral Diagnosis: Reverse Total Shoulder 1/17/17  Referring provider: Kaitlin Christianson MD  Precautions / Restrictions : Focus on HEP; AROM;for ADL's; Don't push too much passively; No restrictions on Flexion or ER at side to 40 Degrees; No reaching behind back for 12 weeks.     Visit Diagnosis:     ICD-10-CM    1. Pain in joint of left shoulder M25.512    2. 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 Generalized muscle weakness M62.81          Assessment:   At 7 weeks post op.  Pt compliant with exercises and precautions.  Swelling is down from last visit.  She is tolerating treatment well.  HEP/POC compliance is  good .  Patient demonstrates understanding/independence with home program.  Patient is appropriate to continue with skilled physical therapy intervention, as indicated by initial plan of care.    Pt. will demonstrate/verbalize independence in self-management of condition in : 12 weeks;Progressing toward  Pt. will be independent with home exercise program in : 12 weeks;Progressing toward  Pt. will report decreased intensity, frequency of : Pain;in 12 weeks;Progressing toward  Pt. will have improved quality of sleep: getting 75-90% of required amount;with less pain;in 12 weeks;Progressing toward   Patient will reach / maintain arm movement: forward;overhead;for home chores;for dressing;with partial ROM;with less pain;in 12 weeks; Progressing toward.    Plan for next visits:     Continue with initial plan of care.  Progress with home program as tolerated.   Add pulleys, table slides, isometric ER/IR, shoulder  depression    Subjective:   Sleeping OK.  Iced after last visit.  Pain up to 6/10. Swelling is down.  Pain Rating: 3/10       Objective:     AROM Flexion 90    Treatment Today      TREATMENT MINUTES COMMENTS   Evaluation     Self-care/ Home management     Manual therapy 20    Neuromuscular Re-education     Therapeutic Activity     Therapeutic Exercises 8 Pt demonstrated HEP.  Worked on scapular stab with flexion.  Exercises:  Exercise #1: Wand press  Exercise #2: Wand ER  Exercise #3: Isometric adduction  Exercise #4: Reverse Codman's       Gait training     Modality__________________                Total 28    Blank areas are intentional and mean the treatment did not include these items.     Margaret Chamberlain PT, CLT  3/8/2017  11:32 AM

## 2021-06-25 NOTE — PROGRESS NOTES
Progress Notes by Margaret Edmonds PT at 3/6/2017  9:30 AM     Author: Margaret Edmonds PT Service: -- Author Type: Physical Therapist    Filed: 5/15/2017  3:23 PM Encounter Date: 3/6/2017 Status: Addendum    : Margaret Edmonds PT (Physical Therapist)    Related Notes: Original Note by Margaret Edmonds PT (Physical Therapist) filed at 3/6/2017  3:10 PM                                                                                    [facility]   Shoulder Initial Evaluation            Patient Name: Sherita Borden  Date of evaluation: 5/15/2017  Referral Diagnosis:  Reverse Total Shoulder 1/17/17  Referring provider: Kaitlin Christianson MD  Precautions / Restrictions : Focus on HEP; AROM;for ADL's; Don't push too much passively; No restrictions on Flexion or ER at side to 40 Degrees    Visit Diagnosis:     ICD-10-CM    1. Pain in joint of left shoulder M25.512    2. Stiffness of right shoulder joint M25.611    3. Generalized muscle weakness M62.81        Assessment:    Pt is 7 weeks post op with resolving pain, stiffness and weakness.  SPADI is at 75%.     Impairments in  pain, posture, ROM, joint mobility, strength, swelling  Patient's signs and symptoms are consistent with Post op RTS on the left..  Prognosis to achieve goals is  good   Pt. is a good candidate for skilled PT services to improve pain levels and function.    Goals:  Pt. will demonstrate/verbalize independence in self-management of condition in : 12 weeks;Progressing toward  Pt. will be independent with home exercise program in : 12 weeks;Met  Pt. will report decreased intensity, frequency of : Pain;in 12 weeks;Met  Pt. will have improved quality of sleep: getting 75-90% of required amount;with less pain;in 12 weeks;Met  Patient will reach / maintain arm movement: forward;overhead;for home chores;for dressing;with partial ROM;with less pain;in 12 weeks;Progressing toward      Patient's expectations/goals are  realistic.    Barriers to Learning or Achieving Goals:  No Barriers.       Plan / Patient Instructions:        Plan of Care:   Authorization / Certification Start Date: 03/06/17  Authorization / Certification Number of Visits: 12  Communication with: Referral Source  Patient Related Instruction: Nature of Condition;Treatment plan and rationale;Self Care instruction;Basis of treatment;Body mechanics;Posture;Precautions;Next steps;Expected outcome  Times per Week: 1-2  Number of Visits: 12  Discharge Planning: to home program  Precautions / Restrictions : Focus on HEP; AROM;for ADL's; Don't push too much passively; No restrictions on Flexion or ER at side to 40 Degrees  Therapeutic Exercise: ROM;Stretching;Strengthening  Neuromuscular Reeducation: kinesio tape;posture;TNE  Manual Therapy: soft tissue mobilization;myofascial release;joint mobilization;muscle energy  Modalities: electrical stimulation  Functional Training (ADL's): self care  Equipment: theraband    POC and pathology of condition were reviewed with patient.  Pt. is in agreement with the Plan of Care  A Home Exercise Program (HEP) was initiated today.  Pt. was instructed in exercises by PT and patient was given a handout with detailed instructions.     Plan for next visit:   Continue MT  Advance exercises  Monitor swelling  .   Subjective:       Social information:   Living Situation:single family home   Occupation:retired   Work Status:NA   Equipment Available: Splint    History of Present Illness:    Sherita is a 76 y.o. female who presents to therapy today with complaints of Pain and achiness in left shoulder after RTS on 1/17/17. Date of onset/duration of symptoms is 1/17/17. Onset was Surgery. Symptoms are intermittent. She denies history of similar symptoms. She describes their previous level of function as limited with edema.  Swelling managment going OK wth night garment and sleeve.  Today forearm is a little swollen due to using tubular bandaging  for the first time.    Pain Ratin  Pain rating at best: 0  Pain rating at worst: 9  Pain description: aching and sharp    Functional limitations are described as occurring with:   lifting  personal cares donning shirt or jacket, donning bra, washing hair and washing body  reaching at shoulder height, overhead and behind back  performing routine daily activities    Patient reports benefit from:  rest  , pain medication, heat       Objective:      Note: Items left blank indicates the item was not performed or not indicated at the time of the evaluation.    Patient Outcome Measures :    Shoulder Pain and Disability Index (SPADI) in %: 45   Scores range from 0-100%, where a score of 0% represents minimal pain and maximal function. The minimal clincically important difference is a score reduction of 10%.     Involved side: Left  Posture Observation:      General sitting posture is  poor.  General standing posture is fair.  Cervical:  Moderate forward head  Shoulder/Thoracic complex: Mildly increased mid thoracic kyphosis  Cervical Clearing: Normal    Shoulder/Elbow ROM     Date: 3/6/17   Shoulder and Elbow ROM ( )   AROM in degrees    Right Left   Shoulder Flexion (0-180 ) 160 80   Shoulder Abduction (0-180 ) 150 55   Shoulder Extension (0-60 ) 75 30   Shoulder ER (0-90 ) 90 10   Shoulder IR (0-70 )     Shoulder IR/Ext T8 Left hamstring   Elbow Flexion (150 )  WNL   Elbow Extension (0 )  WNL    PROM in degrees    Right Left   Shoulder Flexion (0-180 )  85   Shoulder Abduction (0-180 )  65     Shoulder/Elbow Strength   Date:    Shoulder/Elbow Strength (/5)  Manual Muscle Test (MMT) MMT    Right Left   Shoulder Flexion  2   Supraspinatus  2   Shoulder Abduction  2   Shoulder Extension  2   Shoulder External Rotation  2   Shoulder Internal Rotation  2   Elbow Flexion  3   Elbow Extension  3   Other:     Other:            Palpation:   Painful upper and mid trap on left; around incision    Joint  Assessment:  Sternoclavicular Joint Assessment: WNL       Shoulder Special Tests   Not done      Treatment Today     TREATMENT MINUTES COMMENTS   Evaluation 25 Low   Self-care/ Home management 10 Issued ortho stockinette for under night garment. Discussed nature of condition, basis of treatment, POC, precautions.   Manual therapy 7 STM upper trap and left UE   Neuromuscular Re-education     Therapeutic Activity     Therapeutic Exercises 15 Instructed in HEP  Exercises:  Exercise #1: UBE  Comment #1: x3 minutes  Exercise #2: Pulleys  Comment #2: Review  Exercise #3: PNF rhythmic stabilization  Comment #3: x30  Exercise #4: AAROM  Comment #4: all planes  Exercise #5: x  Comment #5: x  Exercise #6: x  Comment #6: x  Exercise #7: x  Comment #7: x  Exercise #8: x  Comment #8: x  Exercise #9: x  Comment #9: x  Exercise #10: x  Comment #10: x     Gait training     Modality__________________                Total 57    Blank areas are intentional and mean the treatment did not include these items.       PT Evaluation Code: (Please list factors)  Patient History/Comorbidities: Lymphedema  Examination: 1  Clinical Presentation: Stable  Clinical Decision Making: Low    Patient History/  Comorbidities Examination  (body structures and functions, activity limitations, and/or participation restrictions) Clinical Presentation Clinical Decision Making (Complexity)   No documented Comorbidities or personal factors 1-2 Elements Stable and/or uncomplicated Low                Margaret Edmonds  5/15/2017  9:31 AM

## 2021-06-25 NOTE — PROGRESS NOTES
Progress Notes by Sherita Genao PTA at 3/29/2017  9:30 AM     Author: Sherita Genao PTA Service: -- Author Type: Physical Therapist Assistant    Filed: 3/29/2017 10:44 AM Encounter Date: 3/29/2017 Status: Signed    : Sherita Genao PTA (Physical Therapist Assistant)       [facility] Daily Progress     Patient Name: Sherita Borden  Date: 3/29/2017  Visit #:7/12  JAVY visit #: 1  Referral Diagnosis: Reverse Total Shoulder 1/17/17  Referring provider: Kaitlin Christianson MD  Precautions / Restrictions : Focus on HEP; AROM;for ADL's; Don't push too much passively; No restrictions on Flexion or ER at side to 40 Degrees; No reaching behind back for 12 weeks.       Visit Diagnosis:     ICD-10-CM    1. 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 Pain in joint of left shoulder M25.512    3. Generalized muscle weakness M62.81    4. Stiffness of right shoulder joint M25.611    5. Lymphedema of left arm I89.0    6. Stiffness of left shoulder joint M25.612          Assessment:   Pt had lots of pain with sidelying ER with 0#, so changed exercise to hooklying position, pillow under upper arm, and reviewed ER limitations.  Pt to stop if pain.  ROM slowly  improving.  Patient is appropriate to continue with skilled physical therapy intervention, as indicated by initial plan of care.    Goal Status:  Pt. will demonstrate/verbalize independence in self-management of condition in : 12 weeks;Progressing toward  Pt. will be independent with home exercise program in : 12 weeks;Progressing toward  Pt. will report decreased intensity, frequency of : Pain;in 12 weeks;Progressing toward  Pt. will have improved quality of sleep: getting 75-90% of required amount;with less pain;in 12 weeks;Progressing toward  Pt. will improve posture : and demonstrate posture with minimal to no cuing;in 6 weeks;Met  Patient will reach / maintain arm movement: forward;overhead;for home chores;for dressing;with partial ROM;with less pain;in 12  weeks;Progressing toward      Plan for next visits:     Continue with initial plan of care.  Progress with home program as tolerated.  Retry sidelying ER. Review pressups with 1#-2#  Subjective:     Pain Rating: 3 Sleeping OK, but deep achey pain at times in  L posterior upper arm, improves with heat or CP  Tried SL shoulder ER 1x, too painful to continue  Objective:   AAROM:  ER:14 degrees; Flexion 110 degrees      Treatment Today      TREATMENT MINUTES COMMENTS   Evaluation     Self-care/ Home management     Manual therapy 15 STM/MFR L upper trap,periscapular region and lateral trunk, AAROM   Neuromuscular Re-education     Therapeutic Activity     Therapeutic Exercises 15 Exercises:  Exercise #1: Wand press  Comment #1: review: added increased flexion added 1#  Exercise #2: Wand ER  Comment #2: Review  Exercise #3: Isometric adduction  Comment #3: Verbal review  Exercise #4: Reverse Codman's  Comment #4: Verbal review  Exercise #5: Isometric ER/IR: x10, 5 sec holds, added to HEP  Comment #5: Table slides: x10, each flexion and horizontal abd/add, added to HEP  Exercise #6: Pulleys  Comment #6: x5 minutes  Exercise #7: Nustep  Comment #7: 4 minutes Level 3, not today  Exercise #8: changed SL ER 0# to supine hooklying with pillow under upper arm, reviewed ER limitations, was more comfortable than SL position  Comment #8: 5-10 reps     Gait training     Modality__________________                Total 30    Blank areas are intentional and mean the treatment did not include these items.     Margaret Chamberlain PT, CLT  3/29/2017  9:28 AM

## 2021-06-25 NOTE — PROGRESS NOTES
Progress Notes by Margaret Edmonds PT at 3/27/2017  9:30 AM     Author: Margaret Edmonds PT Service: -- Author Type: Physical Therapist    Filed: 3/27/2017 12:32 PM Encounter Date: 3/27/2017 Status: Signed    : Margaret Edmonds PT (Physical Therapist)       [facility] Daily Progress     Patient Name: Sherita Borden  Date: 3/27/2017  Visit #:6/12  PTA visit #: 0  Referral Diagnosis: Reverse Total Shoulder 1/17/17  Referring provider: Kaitlin Christianson MD  Precautions / Restrictions : Focus on HEP; AROM;for ADL's; Don't push too much passively; No restrictions on Flexion or ER at side to 40 Degrees; No reaching behind back for 12 weeks.       Visit Diagnosis:     ICD-10-CM    1. Pain in joint of left shoulder M25.512    2. 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 Generalized muscle weakness M62.81    4. Stiffness of right shoulder joint M25.611    5. Lymphedema of left arm I89.0          Assessment:   Pt had lots of pain with sidelying ER with 1#.  Decreased HEP to 0# and stop if pain.  ROM improving.  Patient is appropriate to continue with skilled physical therapy intervention, as indicated by initial plan of care.    Goal Status:  Pt. will demonstrate/verbalize independence in self-management of condition in : 12 weeks;Progressing toward  Pt. will be independent with home exercise program in : 12 weeks;Progressing toward  Pt. will report decreased intensity, frequency of : Pain;in 12 weeks;Progressing toward  Pt. will have improved quality of sleep: getting 75-90% of required amount;with less pain;in 12 weeks;Progressing toward  Pt. will improve posture : and demonstrate posture with minimal to no cuing;in 6 weeks;Met  Patient will reach / maintain arm movement: forward;overhead;for home chores;for dressing;with partial ROM;with less pain;in 12 weeks;Progressing toward      Plan for next visits:     Continue with initial plan of care.  Progress with home program as  tolerated.    Subjective:   Very sore in posterior shoulder this morning. 7/10.  Difficult to have hands on the counter to work.  Pain Ratin      Objective:   AAROM:  ER:20; Flexion 110      Treatment Today      TREATMENT MINUTES COMMENTS   Evaluation     Self-care/ Home management     Manual therapy 12 STM/MFR and Grade I distraction to joint;  AAROM to shoulder   Neuromuscular Re-education     Therapeutic Activity     Therapeutic Exercises 12 Exercises:  Exercise #1: Wand press  Comment #1: review: added increased flexion added 1#  Exercise #2: Wand ER  Comment #2: Review  Exercise #3: Isometric adduction  Comment #3: Verbal review  Exercise #4: Reverse Codman's  Comment #4: Verbal review  Exercise #5: Isometric ER/IR: x10, 5 sec holds, added to HEP  Comment #5: Table slides: x10, each flexion and horizontal abd/add, added to HEP  Exercise #6: Pulleys  Comment #6: x3 minutes  Exercise #7: Nustep  Comment #7: 4 minutes Level 3  Exercise #8: Added SL ER 0#  Comment #8: 5-10 reps     Gait training     Modality__________________                Total 24    Blank areas are intentional and mean the treatment did not include these items.     Margaret Chamberlain PT, CLT  3/27/2017  9:28 AM

## 2021-06-25 NOTE — PROGRESS NOTES
Progress Notes by Fili Ray PT at 3/15/2017 10:00 AM     Author: Fili Ray PT Service: -- Author Type: Physical Therapist    Filed: 3/15/2017 12:14 PM Encounter Date: 3/15/2017 Status: Signed    : Fili Ray PT (Physical Therapist)       [facility] Daily Progress     Patient Name: Sherita Borden  Date: 3/15/2017  Visit #: 3/12  PTA visit #:  0  Referral Diagnosis: Reverse Total Shoulder 1/17/17  Referring provider: Kaitlin Christianson MD  Precautions / Restrictions : Focus on HEP; AROM;for ADL's; Don't push too much passively; No restrictions on Flexion or ER at side to 40 Degrees; No reaching behind back for 12 weeks.     Visit Diagnosis:     ICD-10-CM    1. 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 Pain in joint of left shoulder M25.512    3. Generalized muscle weakness M62.81    4. Stiffness of right shoulder joint M25.611          Assessment:    HEP/POC compliance is  good .  Patient demonstrates understanding/independence with home program.  Patient is appropriate to continue with skilled physical therapy intervention, as indicated by initial plan of care.     At 8 weeks post op.  Pt continues to have limited L shoulder ROM (active and passive), but tolerating exercises well. Pt limited to approximately 90 degrees of elevation. Pt educated that soreness may be present after PT sessions, but ice is encouraged.     Pt. will demonstrate/verbalize independence in self-management of condition in : 12 weeks;Progressing toward  Pt. will be independent with home exercise program in : 12 weeks;Progressing toward  Pt. will report decreased intensity, frequency of : Pain;in 12 weeks;Progressing toward  Pt. will have improved quality of sleep: getting 75-90% of required amount;with less pain;in 12 weeks;Progressing toward   Patient will reach / maintain arm movement: forward;overhead;for home chores;for dressing;with partial ROM;with less pain;in 12 weeks; Progressing toward.    Plan for next  visits:     Continue with initial plan of care.  Progress with home program as tolerated.     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 Ice or heat have been helping.    Objective:     AROM Flexion 90 degrees    Difficulty relaxing with PROM of L shoulder    Min cues for technique with HEP    Treatment Today      TREATMENT MINUTES COMMENTS   Evaluation     Self-care/ Home management     Manual therapy 15 In supine:  - PROM of L shoulder flexion, abduction (light), and ER/IR  - light oscillations for relaxation   Neuromuscular Re-education     Therapeutic Activity     Therapeutic Exercises 15 Pulleys: 5 minutes, flexion    Exercises:  Exercise #1: Wand press  Exercise #2: Wand ER  Exercise #3: Isometric adduction  Exercise #4: Reverse Codman's  Exercise #5: Isometric ER/IR: x10, 5 sec holds, added to HEP  Comment #5: Table slides: x10, each flexion and horizontal abd/add, added to HEP       Gait training     Modality__________________                Total 30    Blank areas are intentional and mean the treatment did not include these items.     Margaret Chamberlain PT, CLT  3/15/2017  11:32 AM

## 2021-06-30 NOTE — PROGRESS NOTES
Progress Notes by Janett Mcneal MD at 1/7/2021  9:30 AM     Author: Janett Mcneal MD Service: -- Author Type: Physician    Filed: 1/7/2021  3:36 PM Encounter Date: 1/7/2021 Status: Signed    : Janett Mcneal MD (Physician)                     Date of Service: 01/07/21    Date last Seen:  07/6/20    PCP: Sheldon Chowdhury MD    Impression:   1. 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 Left shoulder contracture and fibrosis with significant tightness in left upper trapezius after shoulder replacement-stable  3. Osteoarthritis left shoulder-stable  4. Cellulitis left arm history (1/18, 6/13/18, 3/2020)  5. Left breast carcinoma  (2003 mod rad mastectomy, chemo and rad)  6. H/O vertebral osteomyelitis/discitis L2/3    Plan:   1. Questions were answered.    2. Continue Duricef daily.  3. Continue present program with Flexitouch, exercise and compression. Has night sleeve.  Needs new sleeve for day.  Written for.  4. Discussed importance of and how to exercise on a regular basis.  Modifications reviewed with recommendations on using the internet and cable for additional options.  5. Follow up with me in 8 months, or when needed.  The patient will watch for any increased redness, pain, temperature, drainage and/or any new ulcerations in the arm.  She will call the clinic with any questions and/or concerns.      Time spent with patient and chart review in consultation, education and coordination of care:  28  minutes  _____________________________________________________________________     Chief Complaint: Left arm swelling     History of Present Illness: Sherita Borden returns to the [facility] for left arm swelling due to postmastectomy lymphedema complicated by left shoulder replacement after being diagnosed with breast cancer on the left in 2003 treated with modified radical mastectomy followed by chemotherapy and  radiation further complicated by reverse left shoulder replacement on January 17, 2017 and recurrent infections in January 2018, July 2018 and March 2020.  Treatment for postmastectomy swelling included lymphedema bandaging, compression sleeve, night time compression with bandaging, exercises, massage, night time sleeve, range of motion and elevation with Flexitouch.  Course was further complicated by lumbar discitis /vertebral osteomyelitis at L2/3 and left arm cellulitis (3/20).  She continues on chronic antibiotics. At last visit she was to continue her program.  Today she reports she is doing well.  Her swelling has been under good control.  She has not had any pain.  She continues to try to do range of motion.  She and her  are trying to walk regularly though as the weather gets worse there have been concerned about that.  She has been healthy.  She admits that the infection she got in March started when she stopped the antibiotics on her own.  She realizes she needs to be on them on a regular basis daily.  There has been no new numbness, tingling or weakness. There have been no new masses, rashes, or swellings of any other joints. There has been no new unexplained weight loss, loss of appetite, nausea and/or vomiting, shortness of breath, weight loss and chest pain.      Past Medical History:   Diagnosis Date   ? Asymptomatic Postmenopausal Status     Created by Conversion    ? Atrial fibrillation (H)    ? Breast cancer (H) 09/2003   ? Hx antineoplastic chemotherapy    ? Hx of radiation therapy    ? Hypercholesteremia    ? Osteopenia     Created by Conversion    ? Personal history of breast cancer 7/2/2015   ? Post-mastectomy lymphedema syndrome 7/2/2015       Past Surgical History:   Procedure Laterality Date   ? AORTA SURGERY     ? BREAST BIOPSY     ? CLAVICLE SURGERY     ? HERNIA REPAIR     ? left shoulder replacement     ? MASTECTOMY Left    ? TENDON REPAIR Left 6/15/15   ? TOTAL HIP ARTHROPLASTY  Right     2x       Current Outpatient Medications   Medication Sig Dispense Refill   ? acetaminophen (TYLENOL) 500 MG tablet Take 1,000 mg by mouth.     ? atorvastatin (LIPITOR) 10 MG tablet Take 10 mg by mouth daily.  3   ? BIOTIN ORAL Take 1 tablet by mouth daily.     ? biotin-keratin 10,000-100 mcg-mg Tab Take by mouth.     ? CALCIUM CITRATE ORAL Take 1,000 mg by mouth 2 times a day at 6:00 am and 4:00 pm.     ? calcium-mag-vit B6-D3-minerals 906-53-9-125 wo-ra-kh-unit Tab Take 1 tablet by mouth 2 (two) times a day.     ? cefadroxil (DURICEF) 500 MG capsule Take 500 mg by mouth daily.     ? cholecalciferol, vitamin D3, 400 unit Tab Take 1,000 Units by mouth daily.     ? docusate sodium (COLACE) 100 MG capsule Take 100 mg by mouth.     ? flecainide (TAMBOCOR) 50 MG tablet Take 50 mg by mouth.     ? metoprolol tartrate (LOPRESSOR) 25 MG tablet Take 25 mg by mouth 2 (two) times a day.     ? multivitamin-minerals-lutein (CENTRUM SILVER) tablet Take 1 tablet by mouth.     ? polyethylene glycol (MIRALAX) 17 gram packet Take 17 g by mouth.     ? rivaroxaban (XARELTO) 20 mg Tab TAKE 1 TABLET BY MOUTH DAILY.     ? senna-docusate (PERICOLACE) 8.6-50 mg tablet Take 1 tablet by mouth 2 (two) times a day.       No current facility-administered medications for this visit.        Allergies   Allergen Reactions   ? Dicloxacillin Sodium Hives   ? Clindamycin Nausea And Vomiting   ? Penicillins Hives   ? Atorvastatin Unknown       Social History     Socioeconomic History   ? Marital status:      Spouse name: Not on file   ? Number of children: Not on file   ? Years of education: Not on file   ? Highest education level: Not on file   Occupational History   ? Not on file   Social Needs   ? Financial resource strain: Not on file   ? Food insecurity     Worry: Not on file     Inability: Not on file   ? Transportation needs     Medical: Not on file     Non-medical: Not on file   Tobacco Use   ? Smoking status: Never Smoker   ?  Smokeless tobacco: Never Used   Substance and Sexual Activity   ? Alcohol use: No   ? Drug use: No   ? Sexual activity: Yes     Partners: Male     Birth control/protection: Post-menopausal   Lifestyle   ? Physical activity     Days per week: Not on file     Minutes per session: Not on file   ? Stress: Not on file   Relationships   ? Social connections     Talks on phone: Not on file     Gets together: Not on file     Attends Taoism service: Not on file     Active member of club or organization: Not on file     Attends meetings of clubs or organizations: Not on file     Relationship status: Not on file   ? Intimate partner violence     Fear of current or ex partner: Not on file     Emotionally abused: Not on file     Physically abused: Not on file     Forced sexual activity: Not on file   Other Topics Concern   ? Not on file   Social History Narrative    .  2 children.  Retired .       Family History   Problem Relation Age of Onset   ? Corneal abrasion Mother    ? Arthritis Mother    ? COPD Father    ? Arthritis Father    ? Breast cancer Neg Hx        Review of Systems:  See HPI.    Imaging:    I personally reviewed the following imaging results today and those on care everywhere, if indicated    Interface, In Rad Results - 03/17/2020  5:35 PM CDT  EXAM: MR LUMBAR SPINE W/WO IV CONT  LOCATION: [facility]  DATE/TIME: 3/17/2020 4:45 PM    INDICATION: Discitis  COMPARISON: MRI lumbar spine 02/13/2020  CONTRAST: GADOBUTROL 1 MMOL/ML IV SOLN 4.5 mL  TECHNIQUE: Without and with IV contrast    FINDINGS:   Nomenclature will remain concordant with that utilized on the previous addended lumbar spine report and assumes 5 lumbar type vertebra with partial lumbarization of S1. Unchanged lumbar alignment including thoracal lumbar levocurvature and 4 mm anterolisthesis at L4-L5. Multilevel Schmorl's nodes and chronic superior endplate depression of L2 similar to the previous exam.  Extensive endplate edema and enhancement centered at the L2-L3 level. Modic type II endplate signal changes at L3-L4 and L4-L5. Minor Modic type one endplate edema at L5-S1. Normal distal spinal cord and cauda equina with conus medullaris at L1. No evidence for epidural fluid collection or pathologic intradural enhancement. Minor paraspinal inflammatory change centered at L2-L3. No paraspinal fluid collection. Cholelithiasis. Tiny subcentimeter left renal cysts as seen previously. Previous bilateral total hip arthroplasties.    T12-L1: Disc desiccation with minor loss of disc height. Slight annular bulge. Minimal facet arthropathy. No spinal canal or neural foraminal stenosis.     L1-L2: Disc desiccation. Accentuated disc heights centrally. Shallow right central disc protrusion. Mild left facet arthropathy and ligamentum flavum thickening. No spinal canal stenosis. Minimal bilateral neural foraminal stenosis.    L2-L3: Moderate to advanced loss of disc height, progressed since the prior exam. Mild loss of disc signal. Circumferential disc osteophyte complex. Endplate edema and enhancement as above. Mild to moderate facet arthropathy and ligamentum flavum thickening. Moderate spinal canal stenosis with narrowing of both lateral recesses. Moderate right neural foraminal stenosis. Mild to moderate left neural foraminal stenosis..     L3-L4: Advanced loss of disc height and signal. Circumferential disc osteophyte complex eccentric left similar to the previous exam. Moderate bilateral facet arthropathy. Mild spinal canal stenosis with asymmetric narrowing of the left lateral recess. Mild right neural foraminal stenosis. Mild to moderate left neural foraminal stenosis.    L4-L5: Advanced loss of disc height and signal. Grade 1 anterolisthesis with unroofing of the disc. Slight annular bulge and circumferential endplate spurring. Moderate to advanced facet arthropathy and ligamentum flavum thickening. Mild trefoil type  spinal canal stenosis with narrowing of both lateral recesses. No right neural foraminal stenosis. Moderate left neural foraminal stenosis similar to the previous exam.    L5-S1: Advanced loss of disc height and signal. Circumferential disc osteophyte complex. Mild to moderate facet arthropathy and ligamentum flavum thickening. Mild right and moderate left lateral recess stenosis with minimal central spinal canal stenosis. Moderate to severe right neural foraminal stenosis. Mild to moderate left neural foraminal stenosis.    IMPRESSION:  1.  Extensive endplate edema and enhancement concerning for vertebral osteomyelitis centered at L2-L3 similar to findings on 02/13/2020. Some progressive loss of disc height at this level without endplate destruction or epidural/paraspinal abscess formation.  2.  At L2-L3, persistent moderate spinal canal stenosis with narrowing of both lateral recesses and displacement of traversing L3 nerve roots. Moderate right and mild to moderate left neural foraminal stenosis.  3.  At L3-L4, mild spinal canal stenosis with asymmetric narrowing of the left lateral recess. Mild right and mild to moderate left neural foraminal stenosis.  4.  At L4-L5, mild trefoil type spinal canal stenosis. Moderate left neural foraminal stenosis with displacement of the left L4 nerve root.  5.  At L5-S1, left greater than right lateral recess stenosis with moderate to severe right and mild to moderate left neural foraminal stenosis. Impingement upon right greater the left L5 nerve roots.    Nothing new to review.    Labs:    I personally reviewed the following lab results today and those on care everywhere, if indicated    11/15/2019 GFR greater than 60 creatinine 0.9    Physical Exam:  Vitals:    01/07/21 0935   BP: 124/70   Pulse: 60   Resp: 16   Temp: 98.6  F (37  C)     BMI 19.71     Weight 104 (-1) pounds    Circumferential measures:    Vasc Edema 6/11/2018 9/27/2018 9/26/2019 1/6/2020 1/7/2021    Right-just above MCP 18 18 18 17.5 18.2   Right Wrist 14 13.5 13.5 14 14.8   Right Up 10cm 18.5 18.5 18.5 18.7 18.3   Right Up 10cm From Elbow 24.1 25 25.2 24.7 24.8   Left-just above MCP 17.2 17.6 17.2 16.3 17.5   Left Wrist 14.5 14 13.3 13.5 14.2   Left Up 10cm 20.2 20.5 20.5 19.4 20   Left Up 10cm From Elbow 26.2 27 26.5 26.2 26     Swelling measures stable.    General:  80 y.o. female in no apparent distress.      Psych:  Alert and oriented x 3.  Cooperative. Affect normal.    HEENT: Atraumatic and normocephalic.    Musculoskeletal:  Range of motion in shoulders, elbows and wrists is normal except for continued decreased motion in the left shoulder to 160 degrees abduction and forward flexion without active joint synovitis, erythema, swelling or joint laxity.      Neurological:  Sensation is intact to pinprick and light touch throughout the upper extremities bilaterally.  Strength testing is normal in shoulder abduction, elbow flexion, elbow extension, wrist extension and intrinsic function of the hands bilaterally.    Integumentary: Skin of the arms is warm,dry and uniform without rubor, calor or pain to palpation. No pain to palpation.      Lymph node exam: There is no cervical, supraclavicular, infraclavicular, or axillary lymphadenopathy on either side noted.    Janett Mcneal MD, Founding Diplomate SHANICE ANGEL  Medical Director Wound Care and Lymphedema  [facility]  [phone]

## 2021-07-13 ENCOUNTER — RECORDS - HEALTHEAST (OUTPATIENT)
Dept: ADMINISTRATIVE | Facility: CLINIC | Age: 81
End: 2021-07-13

## 2021-07-23 ENCOUNTER — RECORDS - HEALTHEAST (OUTPATIENT)
Dept: ADMINISTRATIVE | Facility: CLINIC | Age: 81
End: 2021-07-23

## 2021-09-11 ENCOUNTER — HEALTH MAINTENANCE LETTER (OUTPATIENT)
Age: 81
End: 2021-09-11

## 2022-06-18 ENCOUNTER — HEALTH MAINTENANCE LETTER (OUTPATIENT)
Age: 82
End: 2022-06-18

## 2022-06-23 ENCOUNTER — LAB (OUTPATIENT)
Dept: LAB | Facility: CLINIC | Age: 82
End: 2022-06-23
Payer: COMMERCIAL

## 2022-06-23 ENCOUNTER — OFFICE VISIT (OUTPATIENT)
Dept: VASCULAR SURGERY | Facility: CLINIC | Age: 82
End: 2022-06-23
Attending: STUDENT IN AN ORGANIZED HEALTH CARE EDUCATION/TRAINING PROGRAM
Payer: COMMERCIAL

## 2022-06-23 VITALS — SYSTOLIC BLOOD PRESSURE: 110 MMHG | HEART RATE: 64 BPM | DIASTOLIC BLOOD PRESSURE: 60 MMHG | OXYGEN SATURATION: 99 %

## 2022-06-23 DIAGNOSIS — T14.8XXA BRUISING: ICD-10-CM

## 2022-06-23 DIAGNOSIS — M24.512 SHOULDER JOINT CONTRACTURE, LEFT: ICD-10-CM

## 2022-06-23 DIAGNOSIS — C50.912 MALIGNANT NEOPLASM OF LEFT BREAST IN FEMALE, ESTROGEN RECEPTOR POSITIVE, UNSPECIFIED SITE OF BREAST (H): Primary | ICD-10-CM

## 2022-06-23 DIAGNOSIS — I97.2 POST-MASTECTOMY LYMPHEDEMA SYNDROME: ICD-10-CM

## 2022-06-23 DIAGNOSIS — Z87.2 HISTORY OF CELLULITIS: ICD-10-CM

## 2022-06-23 DIAGNOSIS — Z17.0 MALIGNANT NEOPLASM OF LEFT BREAST IN FEMALE, ESTROGEN RECEPTOR POSITIVE, UNSPECIFIED SITE OF BREAST (H): Primary | ICD-10-CM

## 2022-06-23 DIAGNOSIS — L90.5 SCAR CONDITION AND FIBROSIS OF SKIN: ICD-10-CM

## 2022-06-23 LAB
ERYTHROCYTE [DISTWIDTH] IN BLOOD BY AUTOMATED COUNT: 14.2 % (ref 10–15)
HCT VFR BLD AUTO: 33.2 % (ref 35–47)
HGB BLD-MCNC: 10.7 G/DL (ref 11.7–15.7)
MCH RBC QN AUTO: 30.3 PG (ref 26.5–33)
MCHC RBC AUTO-ENTMCNC: 32.2 G/DL (ref 31.5–36.5)
MCV RBC AUTO: 94 FL (ref 78–100)
PLATELET # BLD AUTO: 294 10E3/UL (ref 150–450)
RBC # BLD AUTO: 3.53 10E6/UL (ref 3.8–5.2)
WBC # BLD AUTO: 8.5 10E3/UL (ref 4–11)

## 2022-06-23 PROCEDURE — 36415 COLL VENOUS BLD VENIPUNCTURE: CPT

## 2022-06-23 PROCEDURE — 99215 OFFICE O/P EST HI 40 MIN: CPT | Mod: GC | Performed by: STUDENT IN AN ORGANIZED HEALTH CARE EDUCATION/TRAINING PROGRAM

## 2022-06-23 PROCEDURE — 85027 COMPLETE CBC AUTOMATED: CPT

## 2022-06-23 RX ORDER — CLINDAMYCIN HCL 150 MG
CAPSULE ORAL
COMMUNITY

## 2022-06-23 RX ORDER — IBANDRONATE SODIUM 150 MG/1
150 TABLET, FILM COATED ORAL
COMMUNITY
Start: 2022-05-11 | End: 2023-05-11

## 2022-06-23 ASSESSMENT — PAIN SCALES - GENERAL: PAINLEVEL: NO PAIN (0)

## 2022-06-23 NOTE — PROGRESS NOTES
Arm Swelling Follow Up     Date of Service: 2022     Date last seen by Dr. Egan: 2021     PCP: Sheldon Chowdhury     Impression:      1. Left upper extremity post mastectomy lymphedema - stable  2. Left shoulder contracture and fibrosis with significant tightness in left upper trapezius after shoulder replacement - stable  3. Osteoarthritis left shoulder - stable  4. Cellulitis left arm history (, 18, 3/2020)  5. Left breast carcinoma  ( mod rad mastectomy, chemo and rad)  6. H/O vertebral osteomyelitis/discitis L2/3    CBC   New compression sleeve  F/u 1 year    Plan:   1. Questions were answered.    2. Discontinued Duricef daily, doing well and cellulitis resolved and did not recurr  3. Continue with exercise, compression sleeve, and stretching. Has night sleeve. Requested new prescription for sleeve left arm guantlet 30/40mmHg and mastectomy bra, orthotics consult sent.   4. Ordered CBC to evaluate platelets and hemoglobin in setting of multiple small bruises.  5. Follow up with in 12 months, or when needed.  The patient will watch for any increased redness, pain, temperature, drainage and/or any new ulcerations in the arm.  She will call the clinic with any questions and/or concerns.      On day of encounter time spent in chart review and with patient in consultation, exam, education and coordination of care, excluding procedures:  50 minutes          ----------------------------------------------------------------------------------------------------------------    History of Present Illness:   Sherita Borden returns to the [facility] for left arm swelling due to postmastectomy lymphedema complicated by left shoulder replacement after being diagnosed with breast cancer on the left in  treated with modified radical mastectomy followed by chemotherapy and radiation further complicated by reverse left shoulder replacement on   2017 and recurrent infections in January 2018, July 2018 and March 2020.  Treatment for postmastectomy swelling included lymphedema bandaging, compression sleeve, night time compression with bandaging, exercises, massage, night time sleeve, range of motion and elevation with Flexitouch.  Course was further complicated by lumbar discitis /vertebral osteomyelitis at L2/3 and left arm cellulitis (3/20).  She continues on chronic antibiotics.     Her last visit with Dr. Mcneal was on 1/7/21. At that time, the plan was that she was continuing with Duricef daily, and was instructed to continue her program with Flexitouch, exercise and compression including day/nighttime sleeve. New script was written for daytime sleeve.     At today's visit she reports she has been doing well. She has not had an infection since the last visit and has not needed the prophylactic antibiotics. Her swelling overall is controlled and unnoticeable. She wears compression intermittently every other day and wears a seperate one at night as well. Separately, since she has been on Xeralto she has several small bruises on her legs that are resolving and not painful. She is independent and able to participate in her leisure activities such as gardening.    There has been no new numbness, tingling or weakness. There have been no new masses, rashes, or swellings of any other joints. There has been no new unexplained weight loss, loss of appetite, nausea and/or vomiting, shortness of breath, weight loss and chest pain.    Past Medical History:    Past Medical History:   Diagnosis Date     Asymptomatic postmenopausal status (age-related) (natural)     Created by Conversion      Atrial fibrillation (H)      Breast cancer (H) 09/2003     Disorder of bone and cartilage, unspecified     Created by Conversion      Hx antineoplastic chemotherapy      Hx of radiation therapy      Hypercholesteremia      Personal history of breast cancer 7/2/2015      Post-mastectomy lymphedema syndrome 7/2/2015        Surgical History:   Past Surgical History:   Procedure Laterality Date     AORTA SURGERY       BIOPSY BREAST       CLAVICLE SURGERY       HERNIA REPAIR       MASTECTOMY Left      OTHER SURGICAL HISTORY      left shoulder replacement     TENDON REPAIR Left 6/15/15     TOTAL HIP ARTHROPLASTY Right     2x        Medications:    Current Outpatient Medications   Medication     IBANdronate (BONIVA) 150 MG tablet     acetaminophen (TYLENOL) 500 MG tablet     atorvastatin (LIPITOR) 10 MG tablet     BIOTIN ORAL     biotin-keratin 10,000-100 mcg-mg Tab     CALCIUM CITRATE ORAL     calcium-mag-vit B6-D3-minerals 461-13-7-125 my-il-zn-unit Tab     cefadroxil (DURICEF) 500 MG capsule     cholecalciferol, vitamin D3, 400 unit Tab     clindamycin (CLEOCIN) 150 MG capsule     docusate sodium (COLACE) 100 MG capsule     flecainide (TAMBOCOR) 50 MG tablet     metoprolol tartrate (LOPRESSOR) 25 MG tablet     multivitamin-minerals-lutein (CENTRUM SILVER) tablet     polyethylene glycol (MIRALAX) 17 gram packet     rivaroxaban (XARELTO) 20 mg Tab     senna-docusate (PERICOLACE) 8.6-50 mg tablet     No current facility-administered medications for this visit.        Allergies:    Allergies   Allergen Reactions     Dicloxacillin Sodium [Dicloxacillin] Hives     Clindamycin Nausea and Vomiting        Family history:   Family History   Problem Relation Age of Onset     Corneal abrasion Mother      Arthritis Mother      Chronic Obstructive Pulmonary Disease Father      Arthritis Father      Breast Cancer No family hx of         Social History:   Social History     Tobacco Use     Smoking status: Never Smoker     Smokeless tobacco: Never Used   Substance Use Topics     Alcohol use: No     Drug use: No          Review of Systems:   ROS negative except as noted in HPI.     Labs:    I personally reviewed the following lab results today and those on care everywhere, if indicated     No recent  results found for this visit on 06/23/22.       Imaging:     I personally reviewed the following imaging results today and those on care everywhere, if indicated     No results found for this visit on 06/23/22.          Physical Exam:     Vital Signs: /60   Pulse 64   SpO2 99%  There is no height or weight on file to calculate BMI.   Wt Readings from Last 2 Encounters:   01/07/21 104 lb 4.8 oz (47.3 kg)   01/06/20 105 lb (47.6 kg)        Circumferential volume measures:    Circumferential Measures 9/26/2019 1/6/2020 1/7/2021 6/23/2022   Right-just above MCP 18 17.5 18.2 17.2   Right Wrist 13.5 14 14.8 14   Right Up 10cm 18.5 18.7 18.3 19.5   Right Up 10cm From Elbow 25.2 24.7 24.8 22   Left-just above MCP 17.2 16.3 17.5 16.2   Left Wrist 13.3 13.5 14.2 13   Left Up 10cm 20.5 19.4 20 18.5   Left Up 10cm From Elbow 26.5 26.2 26 24       Swelling measures stable, decreased.     General:  80 y.o. female in no apparent distress.       Psych:  Alert and oriented x 3.  Cooperative. Affect normal.     HEENT: Atraumatic and normocephalic.     Musculoskeletal:  Range of motion in shoulders, elbows and wrists is normal except for decreased motion in the left shoulder to 160 degrees abduction and forward flexion without active joint synovitis, erythema, swelling or joint laxity. Limitation of  and DIP flexion, deformity due to arthritis.      Neurological:  Sensation is intact to pinprick and light touch throughout the upper extremities bilaterally.  Strength testing is normal in shoulder abduction, elbow flexion, elbow extension, wrist extension and intrinsic function of the hands bilaterally.     Integumentary: Skin of the arms is warm,dry and uniform without rubor, calor or pain to palpation. No pain to palpation.       Lymph node exam: There is no cervical, supraclavicular, infraclavicular, or axillary lymphadenopathy on either side noted.    Lisa Rae  PGY 3  Physical Medicine and Rehabilitation  [place]  [facility]  Pager: [phone]    Patient was seen and examined with attending physician Dr. Dayami Espino    Physician Attestation   I, Dayami Espino MD, saw this patient and agree with the findings and plan of care as documented in the note.      Items personally reviewed/procedural attestation: vitals, labs and imaging and agree with the interpretation documented in the note.     Dayami Espino MD  Wound Care and Lymphedema   [facility]   [phone]

## 2022-10-29 ENCOUNTER — HEALTH MAINTENANCE LETTER (OUTPATIENT)
Age: 82
End: 2022-10-29

## 2023-06-25 ENCOUNTER — HEALTH MAINTENANCE LETTER (OUTPATIENT)
Age: 83
End: 2023-06-25

## 2024-03-30 ENCOUNTER — HEALTH MAINTENANCE LETTER (OUTPATIENT)
Age: 84
End: 2024-03-30

## 2024-07-23 ENCOUNTER — TELEPHONE (OUTPATIENT)
Dept: VASCULAR SURGERY | Facility: CLINIC | Age: 84
End: 2024-07-23
Payer: COMMERCIAL

## 2024-08-17 ENCOUNTER — HEALTH MAINTENANCE LETTER (OUTPATIENT)
Age: 84
End: 2024-08-17